# Patient Record
Sex: FEMALE | Race: BLACK OR AFRICAN AMERICAN | Employment: FULL TIME | URBAN - METROPOLITAN AREA
[De-identification: names, ages, dates, MRNs, and addresses within clinical notes are randomized per-mention and may not be internally consistent; named-entity substitution may affect disease eponyms.]

---

## 2018-02-15 ENCOUNTER — ANESTHESIA EVENT (OUTPATIENT)
Dept: SURGERY | Age: 38
End: 2018-02-15
Payer: OTHER MISCELLANEOUS

## 2018-02-16 ENCOUNTER — ANESTHESIA (OUTPATIENT)
Dept: SURGERY | Age: 38
End: 2018-02-16
Payer: OTHER MISCELLANEOUS

## 2018-02-16 ENCOUNTER — HOSPITAL ENCOUNTER (OUTPATIENT)
Age: 38
Setting detail: OUTPATIENT SURGERY
Discharge: HOME | End: 2018-02-16
Attending: ORTHOPAEDIC SURGERY | Admitting: ORTHOPAEDIC SURGERY
Payer: OTHER MISCELLANEOUS

## 2018-02-16 VITALS
HEART RATE: 83 BPM | SYSTOLIC BLOOD PRESSURE: 167 MMHG | BODY MASS INDEX: 50.22 KG/M2 | TEMPERATURE: 98.4 F | WEIGHT: 293 LBS | OXYGEN SATURATION: 97 % | DIASTOLIC BLOOD PRESSURE: 83 MMHG | RESPIRATION RATE: 15 BRPM

## 2018-02-16 PROCEDURE — 74011250636 HC RX REV CODE- 250/636

## 2018-02-16 PROCEDURE — 74011000250 HC RX REV CODE- 250

## 2018-02-16 PROCEDURE — 77030018836 HC SOL IRR NACL ICUM -A: Performed by: ORTHOPAEDIC SURGERY

## 2018-02-16 PROCEDURE — 74011250636 HC RX REV CODE- 250/636: Performed by: ANESTHESIOLOGY

## 2018-02-16 PROCEDURE — 77030020143 HC AIRWY LARYN INTUB CGAS -A: Performed by: ANESTHESIOLOGY

## 2018-02-16 PROCEDURE — 74011250636 HC RX REV CODE- 250/636: Performed by: ORTHOPAEDIC SURGERY

## 2018-02-16 PROCEDURE — 74011250637 HC RX REV CODE- 250/637: Performed by: ANESTHESIOLOGY

## 2018-02-16 PROCEDURE — 76010000138 HC OR TIME 0.5 TO 1 HR: Performed by: ORTHOPAEDIC SURGERY

## 2018-02-16 PROCEDURE — 77030006668 HC BLD SHV MENSCS STRY -B: Performed by: ORTHOPAEDIC SURGERY

## 2018-02-16 PROCEDURE — 76210000063 HC OR PH I REC FIRST 0.5 HR: Performed by: ORTHOPAEDIC SURGERY

## 2018-02-16 PROCEDURE — 76060000032 HC ANESTHESIA 0.5 TO 1 HR: Performed by: ORTHOPAEDIC SURGERY

## 2018-02-16 PROCEDURE — 76210000021 HC REC RM PH II 0.5 TO 1 HR: Performed by: ORTHOPAEDIC SURGERY

## 2018-02-16 PROCEDURE — 77030029203 HC IRR KT CYSTO/TUR BBMI -A: Performed by: ORTHOPAEDIC SURGERY

## 2018-02-16 RX ORDER — LIDOCAINE HYDROCHLORIDE 10 MG/ML
0.1 INJECTION INFILTRATION; PERINEURAL AS NEEDED
Status: DISCONTINUED | OUTPATIENT
Start: 2018-02-16 | End: 2018-02-16 | Stop reason: HOSPADM

## 2018-02-16 RX ORDER — FENTANYL CITRATE 50 UG/ML
INJECTION, SOLUTION INTRAMUSCULAR; INTRAVENOUS AS NEEDED
Status: DISCONTINUED | OUTPATIENT
Start: 2018-02-16 | End: 2018-02-16 | Stop reason: HOSPADM

## 2018-02-16 RX ORDER — FENTANYL CITRATE 50 UG/ML
100 INJECTION, SOLUTION INTRAMUSCULAR; INTRAVENOUS ONCE
Status: DISCONTINUED | OUTPATIENT
Start: 2018-02-16 | End: 2018-02-16 | Stop reason: HOSPADM

## 2018-02-16 RX ORDER — PROPOFOL 10 MG/ML
INJECTION, EMULSION INTRAVENOUS AS NEEDED
Status: DISCONTINUED | OUTPATIENT
Start: 2018-02-16 | End: 2018-02-16 | Stop reason: HOSPADM

## 2018-02-16 RX ORDER — ONDANSETRON 2 MG/ML
INJECTION INTRAMUSCULAR; INTRAVENOUS AS NEEDED
Status: DISCONTINUED | OUTPATIENT
Start: 2018-02-16 | End: 2018-02-16 | Stop reason: HOSPADM

## 2018-02-16 RX ORDER — OXYCODONE HYDROCHLORIDE 5 MG/1
5 TABLET ORAL
Status: DISCONTINUED | OUTPATIENT
Start: 2018-02-16 | End: 2018-02-16 | Stop reason: HOSPADM

## 2018-02-16 RX ORDER — HYDROCODONE BITARTRATE AND ACETAMINOPHEN 5; 325 MG/1; MG/1
2 TABLET ORAL AS NEEDED
Status: DISCONTINUED | OUTPATIENT
Start: 2018-02-16 | End: 2018-02-16 | Stop reason: HOSPADM

## 2018-02-16 RX ORDER — FAMOTIDINE 20 MG/1
20 TABLET, FILM COATED ORAL ONCE
Status: COMPLETED | OUTPATIENT
Start: 2018-02-16 | End: 2018-02-16

## 2018-02-16 RX ORDER — MIDAZOLAM HYDROCHLORIDE 1 MG/ML
2 INJECTION, SOLUTION INTRAMUSCULAR; INTRAVENOUS
Status: DISCONTINUED | OUTPATIENT
Start: 2018-02-16 | End: 2018-02-16 | Stop reason: HOSPADM

## 2018-02-16 RX ORDER — MIDAZOLAM HYDROCHLORIDE 1 MG/ML
5 INJECTION, SOLUTION INTRAMUSCULAR; INTRAVENOUS ONCE
Status: DISCONTINUED | OUTPATIENT
Start: 2018-02-16 | End: 2018-02-16 | Stop reason: HOSPADM

## 2018-02-16 RX ORDER — LIDOCAINE HYDROCHLORIDE 20 MG/ML
INJECTION, SOLUTION EPIDURAL; INFILTRATION; INTRACAUDAL; PERINEURAL AS NEEDED
Status: DISCONTINUED | OUTPATIENT
Start: 2018-02-16 | End: 2018-02-16 | Stop reason: HOSPADM

## 2018-02-16 RX ORDER — SODIUM CHLORIDE, SODIUM LACTATE, POTASSIUM CHLORIDE, CALCIUM CHLORIDE 600; 310; 30; 20 MG/100ML; MG/100ML; MG/100ML; MG/100ML
75 INJECTION, SOLUTION INTRAVENOUS CONTINUOUS
Status: DISCONTINUED | OUTPATIENT
Start: 2018-02-16 | End: 2018-02-16 | Stop reason: HOSPADM

## 2018-02-16 RX ORDER — HYDROMORPHONE HYDROCHLORIDE 2 MG/ML
0.5 INJECTION, SOLUTION INTRAMUSCULAR; INTRAVENOUS; SUBCUTANEOUS
Status: DISCONTINUED | OUTPATIENT
Start: 2018-02-16 | End: 2018-02-16 | Stop reason: HOSPADM

## 2018-02-16 RX ORDER — ROPIVACAINE HYDROCHLORIDE 5 MG/ML
INJECTION, SOLUTION EPIDURAL; INFILTRATION; PERINEURAL AS NEEDED
Status: DISCONTINUED | OUTPATIENT
Start: 2018-02-16 | End: 2018-02-16 | Stop reason: HOSPADM

## 2018-02-16 RX ADMIN — FENTANYL CITRATE 50 MCG: 50 INJECTION, SOLUTION INTRAMUSCULAR; INTRAVENOUS at 13:34

## 2018-02-16 RX ADMIN — SODIUM CHLORIDE, SODIUM LACTATE, POTASSIUM CHLORIDE, AND CALCIUM CHLORIDE 75 ML/HR: 600; 310; 30; 20 INJECTION, SOLUTION INTRAVENOUS at 12:00

## 2018-02-16 RX ADMIN — FENTANYL CITRATE 50 MCG: 50 INJECTION, SOLUTION INTRAMUSCULAR; INTRAVENOUS at 13:26

## 2018-02-16 RX ADMIN — SODIUM CHLORIDE, SODIUM LACTATE, POTASSIUM CHLORIDE, AND CALCIUM CHLORIDE: 600; 310; 30; 20 INJECTION, SOLUTION INTRAVENOUS at 13:18

## 2018-02-16 RX ADMIN — MIDAZOLAM HYDROCHLORIDE 2 MG: 1 INJECTION, SOLUTION INTRAMUSCULAR; INTRAVENOUS at 11:00

## 2018-02-16 RX ADMIN — FAMOTIDINE 20 MG: 20 TABLET, FILM COATED ORAL at 12:00

## 2018-02-16 RX ADMIN — PROPOFOL 150 MG: 10 INJECTION, EMULSION INTRAVENOUS at 13:27

## 2018-02-16 RX ADMIN — LIDOCAINE HYDROCHLORIDE 100 MG: 20 INJECTION, SOLUTION EPIDURAL; INFILTRATION; INTRACAUDAL; PERINEURAL at 13:26

## 2018-02-16 RX ADMIN — PROPOFOL 200 MG: 10 INJECTION, EMULSION INTRAVENOUS at 13:26

## 2018-02-16 RX ADMIN — ONDANSETRON 4 MG: 2 INJECTION INTRAMUSCULAR; INTRAVENOUS at 13:36

## 2018-02-16 NOTE — IP AVS SNAPSHOT
[address] 
[phone] Patient: Gale Ellis MRN: [id number] CXR:2/65/3485 About your hospitalization You were admitted on:  February 16, 2018 You last received care in the:  [place] [address] You were discharged on:  February 16, 2018 Why you were hospitalized Your primary diagnosis was:  Not on File Follow-up Information Follow up With Details Comments Contact Info None   None ([phone]) Patient stated that they have no PCP Maria lAejandra Laureano MD  Office will call for follow up appointment [address] [facility] [address] 
[phone] Discharge Orders None A check janell indicates which time of day the medication should be taken. My Medications Notice You have not been prescribed any medications. Discharge Instructions POST OPERATIVE INSTRUCTIONS FOR KNEE ARTHROSCOPY 1. Unless you receive other instructions, you may weight bear as tolerated 2. Apply ice to your knee for 20-30 minutes several times per day for the first 3 days and then as needed. Icing will decrease the amount of inflammation and is helpful after exercise 3. You may remove the dressings the following day and apply waterproof band aids. Some bleeding and drainage may persist for several days following  surgery. Waterproof band aids may be used while showering and avoid tub baths for two weeks. 4. You will be given pain medications and do not drive under the influence. Some patients take pain medication at night and antiinflammatory medication during day  when pain decreases. 5. You may be given Phenergan for nausea 6. You will receive a phone call from our office notifying you of your follow up visit 7. If any problems call [phone] TYPICAL SIDE EFFECTS OF PAIN MEDICATION: 
 *    Constipation: Drink lots of fluids, try prune juice. OTC stool softener if needed. *    Nausea: Take pain medication with food. ACTIVITY · As tolerated and as directed by your doctor. · Bathe or shower as directed by your doctor. DIET 
· Day of surgery: Clear liquids until no nausea or vomiting; small portion, light diet [place] foods (ex: baked chicken, plain rice, grits, scrambled eggs, toast). Nothing greasy, fried or spicy today. · Advance to regular diet on second day, unless your doctor orders otherwise. · If nausea and vomiting continues, call your doctor. PAIN 
· Take pain medication as directed by your doctor. · DO NOT take aspirin or blood thinners unless directed by your doctor. CALL YOUR DOCTOR IF   
s Call your doctor if pain is NOT relieved by medication.  
s Excessive bleeding that does not stop after holding pressure over the area · Temperature of 101 degrees F or above · Excessive redness, swelling or bruising, and/ or green or yellow, smelly discharge from incision AFTER ANESTHESIA · For the first 24 hours: DO NOT Drive, Drink alcoholic beverages, or Make important decisions. · Be aware of dizziness following anesthesia and while taking pain medication. DISCHARGE SUMMARY from Nurse PATIENT INSTRUCTIONS: 
 
After general anesthesia or intravenous sedation, for 24 hours or while taking prescription Narcotics: · Limit your activities · Do not drive and operate hazardous machinery · Do not make important personal or business decisions · Do  not drink alcoholic beverages · If you have not urinated within 8 hours after discharge, please contact your surgeon on call. *  Please give a list of your current medications to your Primary Care Provider. *  Please update this list whenever your medications are discontinued, doses are 
    changed, or new medications (including over-the-counter products) are added. *  Please carry medication information at all times in case of emergency situations. Preventing Infection at Home We care about preventing infection and avoiding the spread of germs  not only when you are in the hospital but also when you return home. When you return home from the hospital, its important to take the following steps to help prevent infection and avoid spreading germs that could infect you and others. Ask everyone in your home to follow these guidelines, too. Clean Your Hands · Clean your hands whenever your hands are visibly dirty, before you eat, before or after touching your mouth, nose or eyes, and before preparing food. Clean them after contact with body fluids, using the restroom, touching animals or changing diapers. · When washing hands, wet them with warm water and work up a lather. Rub hands for at least 15 seconds, then rinse them and pat them dry with a clean towel or paper towel. · When using hand sanitizers, it should take about 15 seconds to rub your hands dry. If not, you probably didnt apply enough . Cover Your Sneeze or Cough Germs are released into the air whenever you sneeze or cough. To prevent the spread of infection: · Turn away from other people before coughing or sneezing. · Cover your mouth or nose with a tissue when you cough or sneeze. Put the tissue in the trash. · If you dont have a tissue, cough or sneeze into your upper sleeve, not your hands. · Always clean your hands after coughing or sneezing. Care for Wounds Your skin is your bodys first line of defense against germs, but an open wound leaves an easy way for germs to enter your body. To prevent infection: · Clean your hands before and after changing wound dressings, and wear gloves to change dressings if recommended by your doctor. · Take special care with IV lines or other devices inserted into the body.  If you must touch them, clean your hands first. 
 · Follow any specific instructions from your doctor to care for your wounds. Contact your doctor if you experience any signs of infection, such as fever or increased redness at the surgical or wound site. Keep a [place] [address] · Clean or wipe commonly touched hard surfaces like door handles, sinks, tabletops, phones and TV remotes. · Use products labeled disinfectant to kill harmful bacteria and viruses. · Use a clean cloth or paper towel to clean and dry surfaces. Wiping surfaces with a dirty dishcloth, sponge or towel will only spread germs. · Never share toothbrushes, rosales, drinking glasses, utensils, razor blades, face cloths or bath towels to avoid spreading germs. · Be sure that the linens that you sleep on are clean. · Keep pets away from wounds and wash your hands after touching pets, their toys or bedding. We care about you and your health. Remember, preventing infections is a team effort between you, your family, friends and health care providers. These are general instructions for a healthy lifestyle: No smoking/ No tobacco products/ Avoid exposure to second hand smoke Surgeon General's Warning:  Quitting smoking now greatly reduces serious risk to your health. Obesity, smoking, and sedentary lifestyle greatly increases your risk for illness A healthy diet, regular physical exercise & weight monitoring are important for maintaining a healthy lifestyle You may be retaining fluid if you have a history of heart failure or if you experience any of the following symptoms:  Weight gain of 3 pounds or more overnight or 5 pounds in a week, increased swelling in our hands or feet or shortness of breath while lying flat in bed. Please call your doctor as soon as you notice any of these symptoms; do not wait until your next office visit. Recognize signs and symptoms of STROKE: 
 
F-face looks uneven A-arms unable to move or move unevenly S-speech slurred or non-existent T-time-call 911 as soon as signs and symptoms begin-DO NOT go Back to bed or wait to see if you get better-TIME IS BRAIN. Introducing hospitals & HEALTH SERVICES! Benedict Tejeda introduces [employer]fresh patient portal. Now you can access parts of your medical record, email your doctor's office, and request medication refills online. 1. In your internet browser, go to [email or web address] 2. Click on the First Time User? Click Here link in the Sign In box. You will see the New Member Sign Up page. 3. Enter your Purfresh Access Code exactly as it appears below. You will not need to use this code after youve completed the sign-up process. If you do not sign up before the expiration date, you must request a new code. · [employer]fresh Access Code: [email or web address] Expires: 5/14/2018  4:57 PM 
 
4. Enter the last four digits of your Social Security Number (xxxx) and Date of Birth (mm/dd/yyyy) as indicated and click Submit. You will be taken to the next sign-up page. 5. Create a Purfresh ID. This will be your Purfresh login ID and cannot be changed, so think of one that is secure and easy to remember. 6. Create a Purfresh password. You can change your password at any time. 7. Enter your Password Reset Question and Answer. This can be used at a later time if you forget your password. 8. Enter your e-mail address. You will receive e-mail notification when new information is available in [address]. 9. Click Sign Up. You can now view and download portions of your medical record. 10. Click the Download Summary menu link to download a portable copy of your medical information. If you have questions, please visit the Frequently Asked Questions section of the Purfresh website. Remember, [employer]fresh is NOT to be used for urgent needs. For medical emergencies, dial 911. Now available from your iPhone and Android! Providers Seen During Your Hospitalization Provider Specialty Primary office phone Shena Horn MD Orthopedic Surgery [phone] Your Primary Care Physician (PCP) Primary Care Physician Office Phone Office Fax NONE ** None ** ** None ** You are allergic to the following No active allergies Recent Documentation Weight BMI OB Status Smoking Status 158.8 kg 50.22 kg/m2 Hysterectomy Never Smoker Emergency Contacts Name Discharge Info Relation Home Work Mobile Nelson Cooper [[id number]] [phone] Patient Belongings The following personal items are in your possession at time of discharge: 
  Dental Appliances: None Please provide this summary of care documentation to your next provider. Signatures-by signing, you are acknowledging that this After Visit Summary has been reviewed with you and you have received a copy. Patient Signature:  ____________________________________________________________ Date:  ____________________________________________________________  
  
Chelle Serna Provider Signature:  ____________________________________________________________ Date:  ____________________________________________________________

## 2018-02-16 NOTE — H&P
Outpatient Surgery History and Physical:  Sharon Crawford was seen and examined. CHIEF COMPLAINT:    r knee . PE:   There were no vitals taken for this visit. Heart:   Regular rhythm      Lungs:  Are clear      Past Medical History: There are no active problems to display for this patient. Surgical History:   Past Surgical History:   Procedure Laterality Date    HX CYST REMOVAL      Left hand    HX TOTAL LAPAROSCOPIC HYSTERECTOMY  2011       Social History: Patient  reports that she has never smoked. She has never used smokeless tobacco. She reports that she drinks alcohol. She reports that she does not use illicit drugs. Family History:   Family History   Problem Relation Age of Onset    Diabetes Mother     Hypertension Mother    Leanor Teena Diabetes Father     Hypertension Father     Diabetes Sister     Hypertension Sister        Allergies: Reviewed per EMR  No Known Allergies    Medications:    No current facility-administered medications on file prior to encounter. No current outpatient prescriptions on file prior to encounter. The surgery is planned for the  Knee . History and physical has been reviewed. The patient has been examined. There have been no significant clinical changes since the completion of the originally dated History and Physical.  Patient identified by surgeon; surgical site was confirmed by patient and surgeon. The patient is here today for outpatient surgery. I have examined the patient, no changes are noted in the patient's medical status. Necessity for the procedure/care is still present and the history and physical above is current. See the office notes for the full long term history of the problem. Please see the recent office notes for the musculoskeletal examination.     Signed By: Terrell Tatum MD     February 16, 2018 7:00 AM

## 2018-02-16 NOTE — PERIOP NOTES
Discharge instructions given to boyfriend. Verbalized understanding and opportunity for questions was given. Dr Ellen Kang okay to discharge at this time. Pt and belongings taken via wheelchair to car.

## 2018-02-16 NOTE — ANESTHESIA POSTPROCEDURE EVALUATION
Post-Anesthesia Evaluation and Assessment    Patient: Mora Klinefelter MRN: [id number]  SSN: [id number]    YOB: 1980  Age: 40 y.o. Sex: female       Cardiovascular Function/Vital Signs  Visit Vitals    BP (!) 158/93 (BP 1 Location: Left arm)    Pulse 91    Temp 36.9 °C (98.4 °F)    Resp 24    Wt 158.8 kg (350 lb)    SpO2 100%    BMI 50.22 kg/m2       Patient is status post general anesthesia for Procedure(s):  KNEE ARTHROSCOPY RIGHT/ MEDIAL MENISCECTOMY/ABRASION ARTHROSCOPY. Nausea/Vomiting: None    Postoperative hydration reviewed and adequate. Pain:  Pain Scale 1: Numeric (0 - 10) (02/16/18 1355)  Pain Intensity 1: 0 (02/16/18 1355)   Managed    Neurological Status:   Neuro (WDL): Exceptions to WDL (02/16/18 1355)  Neuro  RLE Motor Response: Numbness (02/16/18 1355)   At baseline    Mental Status and Level of Consciousness: Arousable    Pulmonary Status:   O2 Device: Oxygen mask (02/16/18 1355)   Adequate oxygenation and airway patent    Complications related to anesthesia: None    Post-anesthesia assessment completed.  No concerns    Signed By: Cherry Correia MD     February 16, 2018

## 2018-02-16 NOTE — DISCHARGE INSTRUCTIONS
POST OPERATIVE INSTRUCTIONS FOR KNEE ARTHROSCOPY    1. Unless you receive other instructions, you may weight bear as tolerated      2. Apply ice to your knee for 20-30 minutes several times per day for the first 3 days and then as needed. Icing will decrease the amount of inflammation and is helpful after exercise    3. You may remove the dressings the following day and apply waterproof band aids. Some bleeding and drainage may persist for several days following  surgery. Waterproof band aids may be used while showering and avoid tub baths for two weeks. 4. You will be given pain medications and do not drive under the influence. Some patients take pain medication at night and antiinflammatory medication during day  when pain decreases. 5. You may be given Phenergan for nausea    6. You will receive a phone call from our office notifying you of your follow up visit    7. If any problems call [phone]    TYPICAL SIDE EFFECTS OF PAIN MEDICATION:  *    Constipation: Drink lots of fluids, try prune juice. OTC stool softener if needed. *    Nausea: Take pain medication with food. ACTIVITY  · As tolerated and as directed by your doctor. · Bathe or shower as directed by your doctor. DIET  · Day of surgery: Clear liquids until no nausea or vomiting; small portion, light diet [place] foods (ex: baked chicken, plain rice, grits, scrambled eggs, toast). Nothing greasy, fried or spicy today. · Advance to regular diet on second day, unless your doctor orders otherwise. · If nausea and vomiting continues, call your doctor. PAIN  · Take pain medication as directed by your doctor. · DO NOT take aspirin or blood thinners unless directed by your doctor.        CALL YOUR DOCTOR IF    s Call your doctor if pain is NOT relieved by medication.   s Excessive bleeding that does not stop after holding pressure over the area  · Temperature of 101 degrees F or above  · Excessive redness, swelling or bruising, and/ or green or yellow, smelly discharge from incision    AFTER ANESTHESIA   · For the first 24 hours: DO NOT Drive, Drink alcoholic beverages, or Make important decisions. · Be aware of dizziness following anesthesia and while taking pain medication. DISCHARGE SUMMARY from Nurse    PATIENT INSTRUCTIONS:    After general anesthesia or intravenous sedation, for 24 hours or while taking prescription Narcotics:  · Limit your activities  · Do not drive and operate hazardous machinery  · Do not make important personal or business decisions  · Do  not drink alcoholic beverages  · If you have not urinated within 8 hours after discharge, please contact your surgeon on call. *  Please give a list of your current medications to your Primary Care Provider. *  Please update this list whenever your medications are discontinued, doses are      changed, or new medications (including over-the-counter products) are added. *  Please carry medication information at all times in case of emergency situations. Preventing Infection at Home  We care about preventing infection and avoiding the spread of germs - not only when you are in the hospital but also when you return home. When you return home from the hospital, its important to take the following steps to help prevent infection and avoid spreading germs that could infect you and others. Ask everyone in your home to follow these guidelines, too. Clean Your Hands  · Clean your hands whenever your hands are visibly dirty, before you eat, before or after touching your mouth, nose or eyes, and before preparing food. Clean them after contact with body fluids, using the restroom, touching animals or changing diapers. · When washing hands, wet them with warm water and work up a lather. Rub hands for at least 15 seconds, then rinse them and pat them dry with a clean towel or paper towel.   · When using hand sanitizers, it should take about 15 seconds to rub your hands dry. If not, you probably didnt apply enough . Cover Your Sneeze or Cough  Germs are released into the air whenever you sneeze or cough. To prevent the spread of infection:  · Turn away from other people before coughing or sneezing. · Cover your mouth or nose with a tissue when you cough or sneeze. Put the tissue in the trash. · If you dont have a tissue, cough or sneeze into your upper sleeve, not your hands. · Always clean your hands after coughing or sneezing. Care for Wounds  Your skin is your bodys first line of defense against germs, but an open wound leaves an easy way for germs to enter your body. To prevent infection:  · Clean your hands before and after changing wound dressings, and wear gloves to change dressings if recommended by your doctor. · Take special care with IV lines or other devices inserted into the body. If you must touch them, clean your hands first.  · Follow any specific instructions from your doctor to care for your wounds. Contact your doctor if you experience any signs of infection, such as fever or increased redness at the surgical or wound site. Keep a Clean Home  · Clean or wipe commonly touched hard surfaces like door handles, sinks, tabletops, phones and TV remotes. · Use products labeled disinfectant to kill harmful bacteria and viruses. · Use a clean cloth or paper towel to clean and dry surfaces. Wiping surfaces with a dirty dishcloth, sponge or towel will only spread germs. · Never share toothbrushes, rosales, drinking glasses, utensils, razor blades, face cloths or bath towels to avoid spreading germs. · Be sure that the linens that you sleep on are clean. · Keep pets away from wounds and wash your hands after touching pets, their toys or bedding. We care about you and your health. Remember, preventing infections is a team effort between you, your family, friends and health care providers.        These are general instructions for a healthy lifestyle:    No smoking/ No tobacco products/ Avoid exposure to second hand smoke    Surgeon General's Warning:  Quitting smoking now greatly reduces serious risk to your health. Obesity, smoking, and sedentary lifestyle greatly increases your risk for illness    A healthy diet, regular physical exercise & weight monitoring are important for maintaining a healthy lifestyle    You may be retaining fluid if you have a history of heart failure or if you experience any of the following symptoms:  Weight gain of 3 pounds or more overnight or 5 pounds in a week, increased swelling in our hands or feet or shortness of breath while lying flat in bed. Please call your doctor as soon as you notice any of these symptoms; do not wait until your next office visit. Recognize signs and symptoms of STROKE:    F-face looks uneven    A-arms unable to move or move unevenly    S-speech slurred or non-existent    T-time-call 911 as soon as signs and symptoms begin-DO NOT go       Back to bed or wait to see if you get better-TIME IS BRAIN.

## 2018-02-16 NOTE — OP NOTES
[facility] MADDIE Angeles  MR#: [id number]  : 1980  ACCOUNT #: [de-identified]   DATE OF SERVICE: 2018    PREOPERATIVE DIAGNOSIS:  Possible meniscal tear of the right knee. POSTOPERATIVE DIAGNOSES:    1. Flap tear of the posterior third of the right medial meniscus. 2.  Small flap tear of the middle third of the lateral meniscus. 3.  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 4.  Thickened hypertrophic medial synovial plica. OPERATIONS PERFORMED:  1. Arthroscopic medial and lateral meniscectomy. 2.  Excision of thickened hypertrophic medial synovial plica. 3.  Chondroplasty of the patella. SURGEON:  Barry Morton MD    ASSISTANT:  None. ANESTHESIA:  General.    BONE LOSS:  Minimal.     ESTIMATED BLOOD LOSS:  None. SPECIMENS REMOVED:  None. COMPLICATIONS:  None. IMPLANTS:  None. DESCRIPTION OF PROCEDURE:  After an adequate level of general anesthesia was obtained, the right leg was prepped and draped in usual sterile fashion. A tourniquet was not used for the procedure. Anteromedial and anterolateral portals were made. The patella tracked well. There was grade II chondromalacia of the undersurface of the patella. A chondroplasty was performed. There was no eburnated bone. There was a thickened hypertrophic medial synovial plica which was resected. This was quite thickened and fibrotic. The ACL and the PCL intact. The medial compartment articular surfaces looked fine except for some mild chondromalacia. A small flap tear of the posterior horn of the medial meniscus resected with the basket and the shaver and left with a stable rim. In the lateral compartment the lateral meniscus was stressed and probed. There was a small flap tear at the middle third, resected with a basket and a shaver. There was no significant chondromalacia laterally. The gutters were clear.   The knee was then copiously irrigated. Sterile dressing was applied.       MD Maikel Berg / Rodney Han  D: 02/16/2018 13:44     T: 02/16/2018 14:45  JOB #: [id number]  CC: Jeffy Nolen MD

## 2018-02-16 NOTE — ANESTHESIA PREPROCEDURE EVALUATION
Anesthetic History   No history of anesthetic complications            Review of Systems / Medical History  Patient summary reviewed and pertinent labs reviewed    Pulmonary  Within defined limits                 Neuro/Psych   Within defined limits           Cardiovascular  Within defined limits                Exercise tolerance: >4 METS     GI/Hepatic/Renal  Within defined limits              Endo/Other        Morbid obesity     Other Findings              Physical Exam    Airway  Mallampati: II  TM Distance: 4 - 6 cm  Neck ROM: normal range of motion   Mouth opening: Normal     Cardiovascular  Regular rate and rhythm,  S1 and S2 normal,  no murmur, click, rub, or gallop             Dental         Pulmonary  Breath sounds clear to auscultation               Abdominal  GI exam deferred       Other Findings            Anesthetic Plan    ASA: 3  Anesthesia type: general          Induction: Intravenous  Anesthetic plan and risks discussed with: Patient

## 2020-10-30 ENCOUNTER — HOSPITAL ENCOUNTER (EMERGENCY)
Age: 40
Discharge: HOME | End: 2020-10-30
Attending: EMERGENCY MEDICINE
Payer: MEDICAID

## 2020-10-30 ENCOUNTER — APPOINTMENT (OUTPATIENT)
Dept: GENERAL RADIOLOGY | Age: 40
End: 2020-10-30
Payer: MEDICAID

## 2020-10-30 VITALS
TEMPERATURE: 97.3 F | WEIGHT: 293 LBS | BODY MASS INDEX: 43.4 KG/M2 | RESPIRATION RATE: 18 BRPM | DIASTOLIC BLOOD PRESSURE: 100 MMHG | HEIGHT: 69 IN | OXYGEN SATURATION: 97 % | SYSTOLIC BLOOD PRESSURE: 143 MMHG | HEART RATE: 100 BPM

## 2020-10-30 PROCEDURE — 72100 X-RAY EXAM L-S SPINE 2/3 VWS: CPT

## 2020-10-30 PROCEDURE — 6360000002 HC RX W HCPCS: Performed by: EMERGENCY MEDICINE

## 2020-10-30 PROCEDURE — 96372 THER/PROPH/DIAG INJ SC/IM: CPT

## 2020-10-30 PROCEDURE — G0382 LEV 3 HOSP TYPE B ED VISIT: HCPCS

## 2020-10-30 RX ORDER — DEXAMETHASONE SODIUM PHOSPHATE 10 MG/ML
10 INJECTION, SOLUTION INTRAMUSCULAR; INTRAVENOUS ONCE
Status: COMPLETED | OUTPATIENT
Start: 2020-10-30 | End: 2020-10-30

## 2020-10-30 RX ORDER — KETOROLAC TROMETHAMINE 30 MG/ML
30 INJECTION, SOLUTION INTRAMUSCULAR; INTRAVENOUS ONCE
Status: COMPLETED | OUTPATIENT
Start: 2020-10-30 | End: 2020-10-30

## 2020-10-30 RX ORDER — METHYLPREDNISOLONE 4 MG/1
TABLET ORAL
Qty: 1 KIT | Refills: 0 | Status: SHIPPED | OUTPATIENT
Start: 2020-10-30 | End: 2020-11-05

## 2020-10-30 RX ORDER — MELOXICAM 15 MG/1
15 TABLET ORAL DAILY
Qty: 30 TABLET | Refills: 0 | Status: SHIPPED | OUTPATIENT
Start: 2020-10-30 | End: 2020-12-30 | Stop reason: ALTCHOICE

## 2020-10-30 RX ADMIN — KETOROLAC TROMETHAMINE 30 MG: 30 INJECTION, SOLUTION INTRAMUSCULAR; INTRAVENOUS at 13:36

## 2020-10-30 RX ADMIN — DEXAMETHASONE SODIUM PHOSPHATE 10 MG: 10 INJECTION INTRAMUSCULAR; INTRAVENOUS at 13:36

## 2020-10-30 ASSESSMENT — PAIN DESCRIPTION - ONSET: ONSET: ON-GOING

## 2020-10-30 ASSESSMENT — ENCOUNTER SYMPTOMS
WHEEZING: 0
EYE DISCHARGE: 0
DIARRHEA: 0
ABDOMINAL DISTENTION: 0
SORE THROAT: 0
BACK PAIN: 1
COUGH: 0
SHORTNESS OF BREATH: 0
EYE REDNESS: 0
EYE PAIN: 0
NAUSEA: 0
SINUS PRESSURE: 0
VOMITING: 0

## 2020-10-30 ASSESSMENT — PAIN DESCRIPTION - FREQUENCY: FREQUENCY: CONTINUOUS

## 2020-10-30 ASSESSMENT — PAIN SCALES - GENERAL
PAINLEVEL_OUTOF10: 8
PAINLEVEL_OUTOF10: 8

## 2020-10-30 ASSESSMENT — PAIN DESCRIPTION - PROGRESSION: CLINICAL_PROGRESSION: NOT CHANGED

## 2020-10-30 ASSESSMENT — PAIN DESCRIPTION - ORIENTATION: ORIENTATION: LOWER

## 2020-10-30 ASSESSMENT — PAIN DESCRIPTION - PAIN TYPE: TYPE: ACUTE PAIN

## 2020-10-30 ASSESSMENT — PAIN DESCRIPTION - DESCRIPTORS: DESCRIPTORS: SHOOTING;SHARP

## 2020-10-30 NOTE — ED PROVIDER NOTES
The history is provided by the patient. Back Pain   Location:  Sacro-iliac joint and lumbar spine  Quality:  Aching  Pain severity:  Moderate  Onset quality:  Gradual  Duration:  1 week  Chronicity:  New  Associated symptoms: no chest pain, no dysuria, no fever, no headaches, no leg pain and no weakness         Review of Systems   Constitutional: Negative for chills and fever. HENT: Negative for ear pain, sinus pressure and sore throat. Eyes: Negative for pain, discharge and redness. Respiratory: Negative for cough, shortness of breath and wheezing. Cardiovascular: Negative for chest pain. Gastrointestinal: Negative for abdominal distention, diarrhea, nausea and vomiting. Genitourinary: Negative for dysuria and frequency. Musculoskeletal: Positive for back pain. Negative for arthralgias. Skin: Negative for rash and wound. Neurological: Negative for weakness and headaches. Hematological: Negative for adenopathy. All other systems reviewed and are negative. Physical Exam  Vitals signs and nursing note reviewed. Constitutional:       Appearance: She is well-developed. HENT:      Head: Normocephalic and atraumatic. Right Ear: Hearing and external ear normal.      Left Ear: Hearing and external ear normal.      Nose: Nose normal.      Mouth/Throat:      Pharynx: Uvula midline. Eyes:      General: Lids are normal.      Conjunctiva/sclera: Conjunctivae normal.      Pupils: Pupils are equal, round, and reactive to light. Neck:      Musculoskeletal: Normal range of motion and neck supple. Cardiovascular:      Rate and Rhythm: Normal rate and regular rhythm. Heart sounds: Normal heart sounds. No murmur. Pulmonary:      Effort: Pulmonary effort is normal.      Breath sounds: Normal breath sounds. Abdominal:      General: Bowel sounds are normal.      Palpations: Abdomen is soft. Abdomen is not rigid. Tenderness: There is no abdominal tenderness.  There is no guarding or rebound. Musculoskeletal:      Lumbar back: She exhibits decreased range of motion and tenderness. Back:    Skin:     General: Skin is warm and dry. Findings: No abrasion or rash. Neurological:      Mental Status: She is alert and oriented to person, place, and time. GCS: GCS eye subscore is 4. GCS verbal subscore is 5. GCS motor subscore is 6. Cranial Nerves: No cranial nerve deficit. Sensory: No sensory deficit. Coordination: Coordination normal.      Gait: Gait normal.          Procedures     MDM          --------------------------------------------- PAST HISTORY ---------------------------------------------  Past Medical History:  has a past medical history of Bone spur, DDD (degenerative disc disease), lumbar, Diabetes mellitus ([facility] [address] Hypertension, and Thyroid disease. Past Surgical History:  has a past surgical history that includes Tonsillectomy; Hysterectomy; other surgical history (Left, 7/31/2015); and knee surgery. Social History:  reports that she has never smoked. She has never used smokeless tobacco. She reports that she does not drink alcohol or use drugs. Family History: family history includes Arthritis in an other family member; Depression in an other family member; Diabetes in her father and another family member; Heart Disease in her mother and another family member; Hypertension in an other family member; Stroke in an other family member. The patients home medications have been reviewed. Allergies: Other and Iodine    -------------------------------------------------- RESULTS -------------------------------------------------  Labs:  No results found for this visit on 10/30/20. Radiology:  XR LUMBAR SPINE (2-3 VIEWS)   Final Result   No acute osseous abnormality. Question gallstones.          Xr Lumbar Spine (2-3 Views)    Result Date: 10/30/2020  EXAMINATION: THREE XRAY VIEWS OF THE LUMBAR SPINE 10/30/2020 1:42 pm COMPARISON: None. HISTORY: ORDERING SYSTEM PROVIDED HISTORY: pain TECHNOLOGIST PROVIDED HISTORY: Reason for exam:->pain No additional history provided. FINDINGS: The lumbar spine is in normal anatomic alignment. Vertebral body heights and intervertebral disc space heights are well maintained. Mild facet arthropathy is present at L5-S1 bilaterally. There is question of multiple gallstones in the right upper quadrant, only visualized on the AP view. This could be correlated with right upper quadrant ultrasound, if clinically warranted. No acute osseous abnormality. Question gallstones. ------------------------- NURSING NOTES AND VITALS REVIEWED ---------------------------  Date / Time Roomed:  10/30/2020  1:15 PM  ED Bed Assignment:  04/04    The nursing notes within the ED encounter and vital signs as below have been reviewed. BP (!) 143/100   Pulse 100   Temp 97.3 °F (36.3 °C) (Temporal)   Resp 18   Ht 5' 9\" (1.753 m)   Wt (!) 400 lb (181.4 kg)   SpO2 97%   BMI 59.07 kg/m²   Oxygen Saturation Interpretation: Normal      ------------------------------------------ PROGRESS NOTES ------------------------------------------  I have spoken with the patient and discussed todays results, in addition to providing specific details for the plan of care and counseling regarding the diagnosis and prognosis. Their questions are answered at this time and they are agreeable with the plan. I discussed at length with them reasons for immediate return here for re evaluation. They will followup with primary care by calling their office tomorrow.     Medications   ketorolac (TORADOL) injection 30 mg (30 mg Intramuscular Given 10/30/20 1336)   dexamethasone (PF) (DECADRON) injection 10 mg (10 mg Intramuscular Given 10/30/20 1336)         --------------------------------- ADDITIONAL PROVIDER NOTES ---------------------------------  At this time the patient is without objective evidence of an acute process requiring hospitalization or inpatient management. They have remained hemodynamically stable throughout their entire ED visit and are stable for discharge with outpatient follow-up. The plan has been discussed in detail and they are aware of the specific conditions for emergent return, as well as the importance of follow-up. New Prescriptions    MELOXICAM (MOBIC) 15 MG TABLET    Take 1 tablet by mouth daily    METHYLPREDNISOLONE (MEDROL, EDMUNDO,) 4 MG TABLET    Take by mouth. Diagnosis:  1. Sacro-iliac pain        Disposition:  Patient's disposition: Discharge to home  Patient's condition is stable.                       Korin Moura MD  10/30/20 [id number]

## 2020-12-30 ENCOUNTER — HOSPITAL ENCOUNTER (INPATIENT)
Age: 40
LOS: 4 days | Discharge: HOME | DRG: 720 | End: 2021-01-03
Attending: EMERGENCY MEDICINE | Admitting: INTERNAL MEDICINE
Payer: MEDICAID

## 2020-12-30 ENCOUNTER — APPOINTMENT (OUTPATIENT)
Dept: GENERAL RADIOLOGY | Age: 40
DRG: 720 | End: 2020-12-30
Payer: MEDICAID

## 2020-12-30 DIAGNOSIS — J96.01 ACUTE RESPIRATORY FAILURE WITH HYPOXIA (HCC): Primary | ICD-10-CM

## 2020-12-30 DIAGNOSIS — U07.1 COVID-19 VIRUS DETECTED: ICD-10-CM

## 2020-12-30 PROBLEM — E66.01 CLASS 3 SEVERE OBESITY DUE TO EXCESS CALORIES WITH BODY MASS INDEX (BMI) OF 50.0 TO 59.9 IN ADULT (HCC): Chronic | Status: ACTIVE | Noted: 2020-12-30

## 2020-12-30 PROBLEM — E66.813 CLASS 3 SEVERE OBESITY DUE TO EXCESS CALORIES WITH BODY MASS INDEX (BMI) OF 50.0 TO 59.9 IN ADULT: Chronic | Status: ACTIVE | Noted: 2020-12-30

## 2020-12-30 LAB
ALBUMIN SERPL-MCNC: 3.7 G/DL (ref 3.5–5.2)
ALP BLD-CCNC: 80 U/L (ref 35–104)
ALT SERPL-CCNC: 15 U/L (ref 0–32)
ANION GAP SERPL CALCULATED.3IONS-SCNC: 10 MMOL/L (ref 7–16)
APTT: 26.7 SEC (ref 24.5–35.1)
AST SERPL-CCNC: 22 U/L (ref 0–31)
BASOPHILS ABSOLUTE: 0.02 E9/L (ref 0–0.2)
BASOPHILS RELATIVE PERCENT: 0.2 % (ref 0–2)
BILIRUB SERPL-MCNC: 0.3 MG/DL (ref 0–1.2)
BILIRUBIN DIRECT: <0.2 MG/DL (ref 0–0.3)
BILIRUBIN, INDIRECT: NORMAL MG/DL (ref 0–1)
BUN BLDV-MCNC: 5 MG/DL (ref 6–20)
CALCIUM SERPL-MCNC: 8.4 MG/DL (ref 8.6–10.2)
CHLORIDE BLD-SCNC: 101 MMOL/L (ref 98–107)
CO2: 26 MMOL/L (ref 22–29)
CREAT SERPL-MCNC: 0.6 MG/DL (ref 0.5–1)
EKG ATRIAL RATE: 97 BPM
EKG P AXIS: 43 DEGREES
EKG P-R INTERVAL: 166 MS
EKG Q-T INTERVAL: 390 MS
EKG QRS DURATION: 84 MS
EKG QTC CALCULATION (BAZETT): 495 MS
EKG R AXIS: 42 DEGREES
EKG T AXIS: -15 DEGREES
EKG VENTRICULAR RATE: 97 BPM
EOSINOPHILS ABSOLUTE: 0 E9/L (ref 0.05–0.5)
EOSINOPHILS RELATIVE PERCENT: 0 % (ref 0–6)
GFR AFRICAN AMERICAN: >60
GFR NON-AFRICAN AMERICAN: >60 ML/MIN/1.73
GLUCOSE BLD-MCNC: 291 MG/DL (ref 74–99)
HCT VFR BLD CALC: 38.1 % (ref 34–48)
HEMOGLOBIN: 12.1 G/DL (ref 11.5–15.5)
IMMATURE GRANULOCYTES #: 0.04 E9/L
IMMATURE GRANULOCYTES %: 0.4 % (ref 0–5)
INR BLD: 1.1
LYMPHOCYTES ABSOLUTE: 1.23 E9/L (ref 1.5–4)
LYMPHOCYTES RELATIVE PERCENT: 13.7 % (ref 20–42)
MCH RBC QN AUTO: 28.8 PG (ref 26–35)
MCHC RBC AUTO-ENTMCNC: 31.8 % (ref 32–34.5)
MCV RBC AUTO: 90.7 FL (ref 80–99.9)
MONOCYTES ABSOLUTE: 0.18 E9/L (ref 0.1–0.95)
MONOCYTES RELATIVE PERCENT: 2 % (ref 2–12)
NEUTROPHILS ABSOLUTE: 7.5 E9/L (ref 1.8–7.3)
NEUTROPHILS RELATIVE PERCENT: 83.7 % (ref 43–80)
PDW BLD-RTO: 13.8 FL (ref 11.5–15)
PLATELET # BLD: 208 E9/L (ref 130–450)
PMV BLD AUTO: 10.8 FL (ref 7–12)
POTASSIUM REFLEX MAGNESIUM: 3.8 MMOL/L (ref 3.5–5)
PRO-BNP: 9 PG/ML (ref 0–125)
PROTHROMBIN TIME: 12.2 SEC (ref 9.3–12.4)
RBC # BLD: 4.2 E12/L (ref 3.5–5.5)
SARS-COV-2, NAAT: DETECTED
SODIUM BLD-SCNC: 137 MMOL/L (ref 132–146)
TOTAL PROTEIN: 7.6 G/DL (ref 6.4–8.3)
TROPONIN: <0.01 NG/ML (ref 0–0.03)
WBC # BLD: 9 E9/L (ref 4.5–11.5)

## 2020-12-30 PROCEDURE — 6360000002 HC RX W HCPCS: Performed by: EMERGENCY MEDICINE

## 2020-12-30 PROCEDURE — 85610 PROTHROMBIN TIME: CPT

## 2020-12-30 PROCEDURE — 84484 ASSAY OF TROPONIN QUANT: CPT

## 2020-12-30 PROCEDURE — 80076 HEPATIC FUNCTION PANEL: CPT

## 2020-12-30 PROCEDURE — 6370000000 HC RX 637 (ALT 250 FOR IP): Performed by: EMERGENCY MEDICINE

## 2020-12-30 PROCEDURE — 6370000000 HC RX 637 (ALT 250 FOR IP): Performed by: INTERNAL MEDICINE

## 2020-12-30 PROCEDURE — 80048 BASIC METABOLIC PNL TOTAL CA: CPT

## 2020-12-30 PROCEDURE — 85730 THROMBOPLASTIN TIME PARTIAL: CPT

## 2020-12-30 PROCEDURE — 83880 ASSAY OF NATRIURETIC PEPTIDE: CPT

## 2020-12-30 PROCEDURE — 99223 1ST HOSP IP/OBS HIGH 75: CPT | Performed by: INTERNAL MEDICINE

## 2020-12-30 PROCEDURE — 2500000003 HC RX 250 WO HCPCS: Performed by: INTERNAL MEDICINE

## 2020-12-30 PROCEDURE — XW033E5 INTRODUCTION OF REMDESIVIR ANTI-INFECTIVE INTO PERIPHERAL VEIN, PERCUTANEOUS APPROACH, NEW TECHNOLOGY GROUP 5: ICD-10-PCS | Performed by: INTERNAL MEDICINE

## 2020-12-30 PROCEDURE — 2580000003 HC RX 258: Performed by: EMERGENCY MEDICINE

## 2020-12-30 PROCEDURE — 2700000000 HC OXYGEN THERAPY PER DAY

## 2020-12-30 PROCEDURE — 6360000002 HC RX W HCPCS: Performed by: INTERNAL MEDICINE

## 2020-12-30 PROCEDURE — 2060000000 HC ICU INTERMEDIATE R&B

## 2020-12-30 PROCEDURE — 87449 NOS EACH ORGANISM AG IA: CPT

## 2020-12-30 PROCEDURE — 85025 COMPLETE CBC W/AUTO DIFF WBC: CPT

## 2020-12-30 PROCEDURE — 71045 X-RAY EXAM CHEST 1 VIEW: CPT

## 2020-12-30 PROCEDURE — 99285 EMERGENCY DEPT VISIT HI MDM: CPT

## 2020-12-30 PROCEDURE — 93005 ELECTROCARDIOGRAM TRACING: CPT | Performed by: EMERGENCY MEDICINE

## 2020-12-30 PROCEDURE — U0002 COVID-19 LAB TEST NON-CDC: HCPCS

## 2020-12-30 PROCEDURE — 2580000003 HC RX 258: Performed by: INTERNAL MEDICINE

## 2020-12-30 RX ORDER — ACETAMINOPHEN 650 MG/1
650 SUPPOSITORY RECTAL EVERY 6 HOURS PRN
Status: DISCONTINUED | OUTPATIENT
Start: 2020-12-30 | End: 2020-12-30 | Stop reason: SDUPTHER

## 2020-12-30 RX ORDER — ATORVASTATIN CALCIUM 10 MG/1
10 TABLET, FILM COATED ORAL DAILY
Status: DISCONTINUED | OUTPATIENT
Start: 2020-12-30 | End: 2021-01-03 | Stop reason: HOSPADM

## 2020-12-30 RX ORDER — GAUZE BANDAGE 2" X 2"
100 BANDAGE TOPICAL DAILY
Status: DISCONTINUED | OUTPATIENT
Start: 2020-12-30 | End: 2021-01-03 | Stop reason: HOSPADM

## 2020-12-30 RX ORDER — VITAMIN B COMPLEX
6000 TABLET ORAL DAILY
Status: DISCONTINUED | OUTPATIENT
Start: 2020-12-30 | End: 2020-12-30 | Stop reason: CLARIF

## 2020-12-30 RX ORDER — 0.9 % SODIUM CHLORIDE 0.9 %
1000 INTRAVENOUS SOLUTION INTRAVENOUS ONCE
Status: COMPLETED | OUTPATIENT
Start: 2020-12-30 | End: 2020-12-30

## 2020-12-30 RX ORDER — ONDANSETRON 2 MG/ML
4 INJECTION INTRAMUSCULAR; INTRAVENOUS EVERY 6 HOURS PRN
Status: DISCONTINUED | OUTPATIENT
Start: 2020-12-30 | End: 2021-01-03 | Stop reason: HOSPADM

## 2020-12-30 RX ORDER — BUDESONIDE AND FORMOTEROL FUMARATE DIHYDRATE 160; 4.5 UG/1; UG/1
2 AEROSOL RESPIRATORY (INHALATION) 2 TIMES DAILY
Status: DISCONTINUED | OUTPATIENT
Start: 2020-12-30 | End: 2021-01-03 | Stop reason: HOSPADM

## 2020-12-30 RX ORDER — SODIUM CHLORIDE 0.9 % (FLUSH) 0.9 %
10 SYRINGE (ML) INJECTION EVERY 12 HOURS SCHEDULED
Status: DISCONTINUED | OUTPATIENT
Start: 2020-12-30 | End: 2021-01-03 | Stop reason: HOSPADM

## 2020-12-30 RX ORDER — SODIUM CHLORIDE 0.9 % (FLUSH) 0.9 %
10 SYRINGE (ML) INJECTION PRN
Status: DISCONTINUED | OUTPATIENT
Start: 2020-12-30 | End: 2021-01-03 | Stop reason: HOSPADM

## 2020-12-30 RX ORDER — POLYETHYLENE GLYCOL 3350 17 G/17G
17 POWDER, FOR SOLUTION ORAL DAILY PRN
Status: DISCONTINUED | OUTPATIENT
Start: 2020-12-30 | End: 2021-01-03 | Stop reason: HOSPADM

## 2020-12-30 RX ORDER — ASCORBIC ACID 500 MG
500 TABLET ORAL DAILY
Status: ON HOLD | COMMUNITY
End: 2021-01-03 | Stop reason: HOSPADM

## 2020-12-30 RX ORDER — ACETAMINOPHEN 325 MG/1
650 TABLET ORAL EVERY 6 HOURS PRN
Status: DISCONTINUED | OUTPATIENT
Start: 2020-12-30 | End: 2021-01-03 | Stop reason: HOSPADM

## 2020-12-30 RX ORDER — LANOLIN ALCOHOL/MO/W.PET/CERES
50 CREAM (GRAM) TOPICAL DAILY
Status: DISCONTINUED | OUTPATIENT
Start: 2020-12-30 | End: 2021-01-03 | Stop reason: HOSPADM

## 2020-12-30 RX ORDER — DEXAMETHASONE 6 MG/1
6 TABLET ORAL DAILY
Status: DISCONTINUED | OUTPATIENT
Start: 2020-12-31 | End: 2021-01-03 | Stop reason: HOSPADM

## 2020-12-30 RX ORDER — ASCORBIC ACID 500 MG
1000 TABLET ORAL DAILY
Status: DISCONTINUED | OUTPATIENT
Start: 2020-12-30 | End: 2021-01-03 | Stop reason: HOSPADM

## 2020-12-30 RX ORDER — HYDROCHLOROTHIAZIDE 12.5 MG/1
12.5 CAPSULE, GELATIN COATED ORAL
COMMUNITY
End: 2022-01-03 | Stop reason: ALTCHOICE

## 2020-12-30 RX ORDER — ACETAMINOPHEN 650 MG/1
650 SUPPOSITORY RECTAL EVERY 6 HOURS PRN
Status: DISCONTINUED | OUTPATIENT
Start: 2020-12-30 | End: 2021-01-03 | Stop reason: HOSPADM

## 2020-12-30 RX ORDER — ZINC SULFATE 50(220)MG
50 CAPSULE ORAL DAILY
Status: DISCONTINUED | OUTPATIENT
Start: 2020-12-30 | End: 2021-01-03 | Stop reason: HOSPADM

## 2020-12-30 RX ORDER — 0.9 % SODIUM CHLORIDE 0.9 %
30 INTRAVENOUS SOLUTION INTRAVENOUS PRN
Status: DISCONTINUED | OUTPATIENT
Start: 2020-12-30 | End: 2021-01-03 | Stop reason: HOSPADM

## 2020-12-30 RX ORDER — PROMETHAZINE HYDROCHLORIDE 25 MG/1
12.5 TABLET ORAL EVERY 6 HOURS PRN
Status: DISCONTINUED | OUTPATIENT
Start: 2020-12-30 | End: 2021-01-03 | Stop reason: HOSPADM

## 2020-12-30 RX ORDER — GUAIFENESIN/DEXTROMETHORPHAN 100-10MG/5
5 SYRUP ORAL EVERY 4 HOURS PRN
Status: DISCONTINUED | OUTPATIENT
Start: 2020-12-30 | End: 2021-01-03 | Stop reason: HOSPADM

## 2020-12-30 RX ORDER — VITAMIN B COMPLEX
1000 TABLET ORAL DAILY
Status: DISCONTINUED | OUTPATIENT
Start: 2020-12-30 | End: 2021-01-03 | Stop reason: HOSPADM

## 2020-12-30 RX ORDER — ZOLPIDEM TARTRATE 5 MG/1
5 TABLET ORAL NIGHTLY PRN
Status: DISCONTINUED | OUTPATIENT
Start: 2020-12-30 | End: 2021-01-03 | Stop reason: HOSPADM

## 2020-12-30 RX ORDER — ATORVASTATIN CALCIUM 10 MG/1
10 TABLET, FILM COATED ORAL DAILY
COMMUNITY
End: 2022-01-03 | Stop reason: ALTCHOICE

## 2020-12-30 RX ORDER — ACETAMINOPHEN 325 MG/1
650 TABLET ORAL EVERY 6 HOURS PRN
Status: DISCONTINUED | OUTPATIENT
Start: 2020-12-30 | End: 2020-12-30 | Stop reason: SDUPTHER

## 2020-12-30 RX ADMIN — Medication 10 ML: at 20:27

## 2020-12-30 RX ADMIN — DEXAMETHASONE 6 MG: 4 TABLET ORAL at 10:17

## 2020-12-30 RX ADMIN — THIAMINE HCL TAB 100 MG 100 MG: 100 TAB at 16:29

## 2020-12-30 RX ADMIN — CHOLECALCIFEROL TAB 125 MCG (5000 UNIT) 5000 UNITS: 125 TAB at 16:29

## 2020-12-30 RX ADMIN — GUAIFENESIN AND DEXTROMETHORPHAN 5 ML: 100; 10 SYRUP ORAL at 22:59

## 2020-12-30 RX ADMIN — Medication 50 MG: at 16:32

## 2020-12-30 RX ADMIN — ZOLPIDEM TARTRATE 5 MG: 5 TABLET ORAL at 20:27

## 2020-12-30 RX ADMIN — OXYCODONE HYDROCHLORIDE AND ACETAMINOPHEN 1000 MG: 500 TABLET ORAL at 16:29

## 2020-12-30 RX ADMIN — PYRIDOXINE HCL TAB 50 MG 50 MG: 50 TAB at 16:29

## 2020-12-30 RX ADMIN — CHOLECALCIFEROL TAB 125 MCG (5000 UNIT) 1000 UNITS: 125 TAB at 16:32

## 2020-12-30 RX ADMIN — ENOXAPARIN SODIUM 40 MG: 40 INJECTION SUBCUTANEOUS at 20:30

## 2020-12-30 RX ADMIN — GUAIFENESIN AND DEXTROMETHORPHAN 5 ML: 100; 10 SYRUP ORAL at 16:29

## 2020-12-30 RX ADMIN — ATORVASTATIN CALCIUM 10 MG: 10 TABLET, FILM COATED ORAL at 16:29

## 2020-12-30 RX ADMIN — SODIUM CHLORIDE 1000 ML: 9 INJECTION, SOLUTION INTRAVENOUS at 08:56

## 2020-12-30 RX ADMIN — REMDESIVIR 200 MG: 100 INJECTION, POWDER, LYOPHILIZED, FOR SOLUTION INTRAVENOUS at 12:13

## 2020-12-30 ASSESSMENT — ENCOUNTER SYMPTOMS
DIARRHEA: 1
COUGH: 1
ABDOMINAL PAIN: 0
EYE PAIN: 0
SHORTNESS OF BREATH: 1
EYE REDNESS: 0
SORE THROAT: 0
NAUSEA: 1
VOMITING: 1
COLOR CHANGE: 0
RHINORRHEA: 1

## 2020-12-30 NOTE — PROGRESS NOTES
Pt educated on IS and proning/ side laying. Pt agreed and will attempt after eating. Will continue to encourage and educate benefits.

## 2020-12-30 NOTE — PLAN OF CARE
Problem: Airway Clearance - Ineffective  Goal: Achieve or maintain patent airway  Outcome: Met This Shift     Problem: Gas Exchange - Impaired  Goal: Absence of hypoxia  Outcome: Met This Shift     Problem: Breathing Pattern - Ineffective  Goal: Ability to achieve and maintain a regular respiratory rate  Outcome: Met This Shift     Problem:  Body Temperature -  Risk of, Imbalanced  Goal: Ability to maintain a body temperature within defined limits  Outcome: Met This Shift     Problem: Isolation Precautions - Risk of Spread of Infection  Goal: Prevent transmission of infection  Outcome: Met This Shift     Problem: Nutrition Deficits  Goal: Optimize nutrtional status  Outcome: Met This Shift     Problem: Loneliness or Risk for Loneliness  Goal: Demonstrate positive use of time alone when socialization is not possible  Outcome: Met This Shift     Problem: Fatigue  Goal: Verbalize increase energy and improved vitality  Outcome: Met This Shift     Problem: Patient Education: Go to Patient Education Activity  Goal: Patient/Family Education  Outcome: Met This Shift     Problem: Falls - Risk of:  Goal: Will remain free from falls  Outcome: Met This Shift  Goal: Absence of physical injury  Outcome: Met This Shift

## 2020-12-31 LAB
ABO/RH: NORMAL
ABO/RH: NORMAL
ALBUMIN SERPL-MCNC: 3.6 G/DL (ref 3.5–5.2)
ALP BLD-CCNC: 84 U/L (ref 35–104)
ALT SERPL-CCNC: 13 U/L (ref 0–32)
ANION GAP SERPL CALCULATED.3IONS-SCNC: 13 MMOL/L (ref 7–16)
ANTIBODY SCREEN: NORMAL
AST SERPL-CCNC: 19 U/L (ref 0–31)
BILIRUB SERPL-MCNC: 0.3 MG/DL (ref 0–1.2)
BLOOD BANK DISPENSE STATUS: NORMAL
BLOOD BANK DISPENSE STATUS: NORMAL
BLOOD BANK PRODUCT CODE: NORMAL
BLOOD BANK PRODUCT CODE: NORMAL
BPU ID: NORMAL
BPU ID: NORMAL
BUN BLDV-MCNC: 5 MG/DL (ref 6–20)
C-REACTIVE PROTEIN: 16.7 MG/DL (ref 0–0.4)
CALCIUM SERPL-MCNC: 8.8 MG/DL (ref 8.6–10.2)
CHLORIDE BLD-SCNC: 101 MMOL/L (ref 98–107)
CO2: 25 MMOL/L (ref 22–29)
CREAT SERPL-MCNC: 0.5 MG/DL (ref 0.5–1)
D DIMER: 695 NG/ML DDU
DESCRIPTION BLOOD BANK: NORMAL
DESCRIPTION BLOOD BANK: NORMAL
FERRITIN: 1060 NG/ML
FIBRINOGEN: >700 MG/DL (ref 225–540)
GFR AFRICAN AMERICAN: >60
GFR NON-AFRICAN AMERICAN: >60 ML/MIN/1.73
GLUCOSE BLD-MCNC: 247 MG/DL (ref 74–99)
INR BLD: 1.2
L. PNEUMOPHILA SEROGP 1 UR AG: NORMAL
LACTATE DEHYDROGENASE: 422 U/L (ref 135–214)
POTASSIUM REFLEX MAGNESIUM: 4 MMOL/L (ref 3.5–5)
PROCALCITONIN: 0.16 NG/ML (ref 0–0.08)
PROTHROMBIN TIME: 13.2 SEC (ref 9.3–12.4)
SODIUM BLD-SCNC: 139 MMOL/L (ref 132–146)
STREP PNEUMONIAE ANTIGEN, URINE: NORMAL
TOTAL PROTEIN: 8 G/DL (ref 6.4–8.3)
TROPONIN: <0.01 NG/ML (ref 0–0.03)
VITAMIN D 25-HYDROXY: 11 NG/ML (ref 30–100)

## 2020-12-31 PROCEDURE — C1751 CATH, INF, PER/CENT/MIDLINE: HCPCS

## 2020-12-31 PROCEDURE — 36410 VNPNXR 3YR/> PHY/QHP DX/THER: CPT

## 2020-12-31 PROCEDURE — 2580000003 HC RX 258: Performed by: INTERNAL MEDICINE

## 2020-12-31 PROCEDURE — 6360000002 HC RX W HCPCS: Performed by: INTERNAL MEDICINE

## 2020-12-31 PROCEDURE — 6370000000 HC RX 637 (ALT 250 FOR IP): Performed by: INTERNAL MEDICINE

## 2020-12-31 PROCEDURE — 2060000000 HC ICU INTERMEDIATE R&B

## 2020-12-31 PROCEDURE — XW13325 TRANSFUSION OF CONVALESCENT PLASMA (NONAUTOLOGOUS) INTO PERIPHERAL VEIN, PERCUTANEOUS APPROACH, NEW TECHNOLOGY GROUP 5: ICD-10-PCS | Performed by: INTERNAL MEDICINE

## 2020-12-31 PROCEDURE — 36430 TRANSFUSION BLD/BLD COMPNT: CPT

## 2020-12-31 PROCEDURE — 99233 SBSQ HOSP IP/OBS HIGH 50: CPT | Performed by: INTERNAL MEDICINE

## 2020-12-31 PROCEDURE — 2500000003 HC RX 250 WO HCPCS: Performed by: INTERNAL MEDICINE

## 2020-12-31 PROCEDURE — 2700000000 HC OXYGEN THERAPY PER DAY

## 2020-12-31 PROCEDURE — 76937 US GUIDE VASCULAR ACCESS: CPT

## 2020-12-31 PROCEDURE — 02HV33Z INSERTION OF INFUSION DEVICE INTO SUPERIOR VENA CAVA, PERCUTANEOUS APPROACH: ICD-10-PCS | Performed by: INTERNAL MEDICINE

## 2020-12-31 RX ORDER — SODIUM CHLORIDE 9 MG/ML
INJECTION, SOLUTION INTRAVENOUS PRN
Status: DISCONTINUED | OUTPATIENT
Start: 2020-12-31 | End: 2021-01-03 | Stop reason: HOSPADM

## 2020-12-31 RX ORDER — HEPARIN SODIUM (PORCINE) LOCK FLUSH IV SOLN 100 UNIT/ML 100 UNIT/ML
1 SOLUTION INTRAVENOUS EVERY 12 HOURS SCHEDULED
Status: DISCONTINUED | OUTPATIENT
Start: 2020-12-31 | End: 2021-01-03 | Stop reason: HOSPADM

## 2020-12-31 RX ORDER — HEPARIN SODIUM (PORCINE) LOCK FLUSH IV SOLN 100 UNIT/ML 100 UNIT/ML
1 SOLUTION INTRAVENOUS PRN
Status: DISCONTINUED | OUTPATIENT
Start: 2020-12-31 | End: 2021-01-03 | Stop reason: HOSPADM

## 2020-12-31 RX ORDER — SODIUM CHLORIDE 0.9 % (FLUSH) 0.9 %
10 SYRINGE (ML) INJECTION PRN
Status: DISCONTINUED | OUTPATIENT
Start: 2020-12-31 | End: 2021-01-03 | Stop reason: HOSPADM

## 2020-12-31 RX ADMIN — ZOLPIDEM TARTRATE 5 MG: 5 TABLET ORAL at 20:53

## 2020-12-31 RX ADMIN — Medication 10 ML: at 07:50

## 2020-12-31 RX ADMIN — GUAIFENESIN AND DEXTROMETHORPHAN 5 ML: 100; 10 SYRUP ORAL at 07:50

## 2020-12-31 RX ADMIN — ACETAMINOPHEN 650 MG: 325 TABLET, FILM COATED ORAL at 07:51

## 2020-12-31 RX ADMIN — CHOLECALCIFEROL TAB 125 MCG (5000 UNIT) 1000 UNITS: 125 TAB at 07:51

## 2020-12-31 RX ADMIN — Medication 100 UNITS: at 20:53

## 2020-12-31 RX ADMIN — ATORVASTATIN CALCIUM 10 MG: 10 TABLET, FILM COATED ORAL at 07:53

## 2020-12-31 RX ADMIN — Medication 50 MG: at 07:51

## 2020-12-31 RX ADMIN — THIAMINE HCL TAB 100 MG 100 MG: 100 TAB at 07:51

## 2020-12-31 RX ADMIN — ENOXAPARIN SODIUM 40 MG: 40 INJECTION SUBCUTANEOUS at 20:52

## 2020-12-31 RX ADMIN — REMDESIVIR 100 MG: 100 INJECTION, POWDER, LYOPHILIZED, FOR SOLUTION INTRAVENOUS at 09:14

## 2020-12-31 RX ADMIN — CHOLECALCIFEROL TAB 125 MCG (5000 UNIT) 5000 UNITS: 125 TAB at 07:54

## 2020-12-31 RX ADMIN — OXYCODONE HYDROCHLORIDE AND ACETAMINOPHEN 1000 MG: 500 TABLET ORAL at 07:51

## 2020-12-31 RX ADMIN — PYRIDOXINE HCL TAB 50 MG 50 MG: 50 TAB at 07:54

## 2020-12-31 RX ADMIN — ENOXAPARIN SODIUM 40 MG: 40 INJECTION SUBCUTANEOUS at 07:50

## 2020-12-31 RX ADMIN — Medication 10 ML: at 20:53

## 2020-12-31 RX ADMIN — DEXAMETHASONE 6 MG: 6 TABLET ORAL at 07:51

## 2020-12-31 ASSESSMENT — PAIN DESCRIPTION - LOCATION: LOCATION: GENERALIZED

## 2020-12-31 ASSESSMENT — PAIN SCALES - GENERAL: PAINLEVEL_OUTOF10: 2

## 2020-12-31 NOTE — CARE COORDINATION
CM Note: 12/31/1010 at 9:09am: COVID POSITIVE - test done on 12/31/2020. CM called and talked to the patient on the phone in her room. States she lives with her boyfriend in a one story home with no steps to enter. States she is independent with her ADL's. States no hx of home oxygen or nebulizer - currrently on 6L NC. DME: none. States no hx of HHC or SNF. PCP: Dr. Patricia Li of [facility] - Phone: [phone]. Pharmacy: [facility] [address] in [place]. States plans are to return home when discharged and her boyfriend will provide transportation.  Nolberto Peace RN

## 2020-12-31 NOTE — PROGRESS NOTES
SL Midline  Placement 12/31/2020    Product number: [id number]    Lot Number: [id number]      Ultrasound: yes   R Cephalic      Upper Arm Circumference: 51CM    Size: 4.5CM/15CM   Exposed Length: 0   Internal Length: 15CM   Cut: 0   Vein Measurement: 0.55  Brisk blood return from midline, pt sherif well, site intact, NS flushed and clamped    Ferchoe Liana  12/31/2020  12:15 PM

## 2020-12-31 NOTE — H&P
[address] [place], [place] [address]ist Group   History and Physical      CHIEF COMPLAINT: Shortness of breath    History of Present Illness:  36 y.o. female with a history of morbid obesity but no other known medical problems who presents with symptoms concerning for Covid. Her boyfriend was diagnosed with Covid week ago Monday and she is put herself in quarantine since then. Last Wednesday, patient started having symptoms of fevers and chills and just felt fatigued. She pretty much stayed home and was in bed but the night prior to admission she started getting shortness of breath and having a lot of coughing and retching. She did have some associated diarrhea. She came in today because the shortness of breath was much worse today. In the ER, patient tested positive for Covid. Chest x-ray showed bilateral airspace opacities consistent with multifocal pneumonia or pulmonary edema. Initial pulse ox was only 74% on room air but with 4 L she got up to 93%. Patient was given Decadron and per my request was started on remdesivir. She was admitted to the Covid floor for further treatment evaluation. Informant(s) for H&P:patient and EMR    REVIEW OF SYSTEMS:  As per HPI, otherwise negative. PMH:  Past Medical History:   Diagnosis Date    Bone spur     left heel    DDD (degenerative disc disease), lumbar 10/9/2012    Thyroid disease        Surgical History:  Past Surgical History:   Procedure Laterality Date    HYSTERECTOMY      KNEE SURGERY      OTHER SURGICAL HISTORY Left 7/31/2015    EXCISION LEFT WRIST MASS    TONSILLECTOMY         Medications Prior to Admission:    Prior to Admission medications    Medication Sig Start Date End Date Taking?  Authorizing Provider   ascorbic acid (VITAMIN C) 500 MG tablet Take 500 mg by mouth daily   Yes Historical Provider, MD   atorvastatin (LIPITOR) 10 MG tablet Take 10 mg by mouth daily   Yes Historical Provider, MD   hydroCHLOROthiazide (MICROZIDE) 12.5 MG capsule Take 12.5 mg by mouth three times a week Monday, Wednesday, Friday   Yes Historical Provider, MD       Allergies: Other and Iodine    Social History:    reports that she has never smoked. She has never used smokeless tobacco. She reports that she does not drink alcohol or use drugs. Family History:   family history includes Arthritis in an other family member; Depression in an other family member; Diabetes in her father and another family member; Heart Disease in her mother and another family member; Hypertension in an other family member; Stroke in an other family member. PHYSICAL EXAM:  Vitals:  BP (!) 142/89   Pulse 100   Temp 97.2 °F (36.2 °C) (Infrared)   Resp 18   Ht 5' 10\" (1.778 m)   Wt (!) 383 lb (173.7 kg)   SpO2 94%   BMI 54.95 kg/m²     General Appearance: alert and oriented to person, place and time and in mild respiratory distress. She appears anxious. Skin: warm and dry  Head: normocephalic and atraumatic  Eyes: pupils equal, round, and reactive to light, extraocular eye movements intact, conjunctivae normal  Neck: neck supple and non tender without mass   Pulmonary/Chest: diminished breath sounds but with scattered wheezes.   Cardiovascular: normal rate, normal S1 and S2 and no carotid bruits  Abdomen: soft, obese, non-tender, non-distended, normal bowel sounds, no masses or organomegaly  Extremities: no cyanosis, no clubbing and no edema  Neurologic: no cranial nerve deficit and speech normal    LABS:  Recent Labs     12/30/20 0844      K 3.8      CO2 26   BUN 5*   CREATININE 0.6   GLUCOSE 291*   CALCIUM 8.4*       Recent Labs     12/30/20 0844   WBC 9.0   RBC 4.20   HGB 12.1   HCT 38.1   MCV 90.7   MCH 28.8   MCHC 31.8*   RDW 13.8      MPV 10.8       Troponin [[id number]] Collected: 12/30/20 0844     Specimen: Blood Updated: 12/30/20 0948      Troponin <0.01 ng/mL          Brain Natriuretic Peptide [[id number]] Collected: 12/30/20 0844     Specimen: Blood Updated: 12/30/20 0948      Pro-BNP 9 pg/mL      COVID-19 [[id number]] (Abnormal) Collected: 12/30/20 0844      Updated: 12/30/20 0921      SARS-CoV-2, NAAT DETECTED         Radiology:     Xr Chest Portable    Result Date: 12/30/2020  EXAMINATION: ONE XRAY VIEW OF THE CHEST 12/30/2020 8:55 am COMPARISON: Chest radiograph March 28, 2015 HISTORY: ORDERING SYSTEM PROVIDED HISTORY: sob TECHNOLOGIST PROVIDED HISTORY: Reason for exam:->sob FINDINGS: Trachea is midline. Cardiomediastinal silhouette is stable in size. There are bilateral airspace opacities scattered throughout the lungs. No pneumothorax. No sizable pleural effusions    Bilateral airspace opacity may represent multifocal pneumonia or pulmonary edema in the proper clinical setting. EKG: NSR at 97 with low QRS and nonspecific T wave changes    ASSESSMENT:      Principal Problem:    Acute hypoxemic respiratory failure due to COVID-19 [facility])  Active Problems:    Class 3 severe obesity due to excess calories with body mass index (BMI) of 50.0 to 59.9 in adult [facility])  Resolved Problems:    * No resolved hospital problems. *      PLAN:    1. Acute hypoxic respiratory failure due to COVID-19--continue treatment with Decadron and remdesivir. Placed on vitamin regimen of thiamine, B6, C, D and zinc.  Lovenox 40 mg twice daily given BMI greater than 30. Discussed convalescent plasma and patient is agreeable. Will type and screen in the morning reevaluate her inflammatory markers. May also be candidate for tocilizumab. CXR unlikely to represent pulmonary edema with normal BNP. However, may consider CT of chest to further characterize infiltrates and or evaluate for PE. D Dimer pending. 2. Morbid obesity with BMI of 54.95--diet and lifestyle modifications counseled. Code Status: FULL  DVT prophylaxis: Lovenox. NOTE: This report was transcribed using voice recognition software.  Every effort was made to ensure accuracy; however, inadvertent computerized transcription errors may be present.     Electronically signed by Josselin Maguire MD on 12/30/2020 at 9:13 PM

## 2020-12-31 NOTE — ACP (ADVANCE CARE PLANNING)
Advance Care Planning     Advance Care Planning Activator (Inpatient)  Conversation Note      Date of ACP Conversation: 12/31/2020  Conversation Conducted with: Patient  ACP Activator: Russel Hays RN    *When Decision Maker makes decisions on behalf of the incapacitated patient: Decision Maker is asked to consider and make decisions based on patient values, known preferences, or best interests. Health Care Decision Maker:     Current Designated Health Care Decision Maker:   Primary Decision Maker: Megan Lindsay - Brother/Sister - [phone]    Secondary Decision Maker: Placido Keys - Brother/Sister - [phone]    Note: If the relationship of these Decision-Makers to the patient does NOT follow your state's Next of Kin hierarchy, recommend that patient complete ACP document that meets state-specific requirements to allow them to act on the patient's behalf when appropriate. Care Preferences    Ventilation: \"If you were in your present state of health and suddenly became very ill and were unable to breathe on your own, what would your preference be about the use of a ventilator (breathing machine) if it were available to you? \"      Would the patient desire the use of ventilator (breathing machine)?: yes    \"If your health worsens and it becomes clear that your chance of recovery is unlikely, what would your preference be about the use of a ventilator (breathing machine) if it were available to you? \"     Would the patient desire the use of ventilator (breathing machine)?: Yes      Resuscitation  \"CPR works best to restart the heart when there is a sudden event, like a heart attack, in someone who is otherwise healthy. Unfortunately, CPR does not typically restart the heart for people who have serious health conditions or who are very sick. \"    \"In the event your heart stopped as a result of an underlying serious health condition, would you want attempts to be made to restart your heart (answer \"yes\" for attempt to resuscitate) or would you prefer a natural death (answer \"no\" for do not attempt to resuscitate)? \" yes      NOTE: If the patient has a valid advance directive AND now provides care preference(s) that are inconsistent with that prior directive, advise the patient to consider either: creating a new advance directive that complies with state-specific requirements; or, if that is not possible, orally revoking that prior directive in accordance with state-specific requirements, which must be documented in the EHR. [] Yes   [x] No   Educated Patient / Abbey Rosalinol regarding differences between Advance Directives and portable DNR orders.     Length of ACP Conversation in minutes:      Conversation Outcomes:  [x] ACP discussion completed  [] Existing advance directive reviewed with patient; no changes to patient's previously recorded wishes  [] New Advance Directive completed  [] Portable Do Not Rescitate prepared for Provider review and signature  [] POLST/POST/MOLST/MOST prepared for Provider review and signature      Follow-up plan:    [] Schedule follow-up conversation to continue planning  [] Referred individual to Provider for additional questions/concerns   [] Advised patient/agent/surrogate to review completed ACP document and update if needed with changes in condition, patient preferences or care setting    [x] This note routed to one or more involved healthcare providers

## 2020-12-31 NOTE — PROGRESS NOTES
Dr. Erma Schwab about patient's request for a sleep aid. 5mg Ambien nightly PRN ordered. Disaster charting/documentation continues and initiated at 2000.

## 2020-12-31 NOTE — PROGRESS NOTES
[address] [place], [place] [address]ist   Progress Note    Admitting Date and Time: 12/30/2020  7:58 AM  Admit Dx: Acute hypoxemic respiratory failure due to COVID-19 (HCC) [U07.1, J96.01]    Subjective:    Pt feels better. States she is less short of breath, she is able to speak more without losing her breath and she can take deeper breaths. Per RN: patient received 2 units of convalescent plasma that I ordered. She has new power midline R basilic placed today.  lidocaine  5 mL Intradermal Once    heparin flush  1 mL Intravenous 2 times per day    remdesivir IVPB  100 mg Intravenous Q24H    influenza virus vaccine  0.5 mL Intramuscular Prior to discharge    atorvastatin  10 mg Oral Daily    enoxaparin  40 mg Subcutaneous BID    dexamethasone  6 mg Oral Daily    vitamin C  1,000 mg Oral Daily    vitamin B-6  50 mg Oral Daily    thiamine mononitrate  100 mg Oral Daily    zinc sulfate  50 mg Oral Daily    sodium chloride flush  10 mL Intravenous 2 times per day    Vitamin D  1,000 Units Oral Daily    And    vitamin D3  5,000 Units Oral Daily    budesonide-formoterol  2 puff Inhalation BID         sodium chloride, , PRN      sodium chloride flush, 10 mL, PRN      heparin flush, 1 mL, PRN      sodium chloride, 30 mL, PRN      guaiFENesin-dextromethorphan, 5 mL, Q4H PRN      sodium chloride flush, 10 mL, PRN      promethazine, 12.5 mg, Q6H PRN    Or      ondansetron, 4 mg, Q6H PRN      polyethylene glycol, 17 g, Daily PRN      acetaminophen, 650 mg, Q6H PRN    Or      acetaminophen, 650 mg, Q6H PRN      zolpidem, 5 mg, Nightly PRN         Objective:    /77   Pulse 95   Temp 97 °F (36.1 °C) (Infrared)   Resp 20   Ht 5' 10\" (1.778 m)   Wt (!) 383 lb (173.7 kg)   SpO2 94%   BMI 54.95 kg/m²   General Appearance: alert and oriented to person, place and time with less conversational dyspnea.   Skin: warm and dry  Head: normocephalic and atraumatic  Eyes: pupils equal, round, and 12/31/2020 04:56 AM       Strep Pneumoniae Antigen [[id number]] Collected: 12/30/20 2242     Specimen: Urine, clean catch Updated: 12/31/20 1050      STREP PNEUMONIAE ANTIGEN, URINE --      Presumptive negative- suggests no current or recent   pneumococcal infection. Infection due to Strep pneumoniae cannot be   ruled out since the antigen present in the sample   may be below the detection limit of the test.   Normal Range:Presumptive Negative      Narrative:       Source: URINE       Site: Urine&Urine               Legionella antigen, urine [[id number]] Collected: 12/30/20 2242     Specimen: Urine, clean catch Updated: 12/31/20 1048      L. pneumophila Serogp 1 Ur Ag --      Presumptive Negative -suggesting no recent or current infections   with Legionella pneumophila serogroup 1. Infection to Legionella cannot be ruled out since other serogroups   and species may cause infection, antigen may not be present in   early infection, or level of antigen may be below the   detection limit. Normal Range: Presumptive Negative      Narrative:       Source: URINE       Site: Urine&Urine         Radiology:   XR CHEST PORTABLE   Final Result   Bilateral airspace opacity may represent multifocal pneumonia or pulmonary   edema in the proper clinical setting. Assessment:  Principal Problem:    Acute hypoxemic respiratory failure due to COVID-19 [facility])  Active Problems:    Class 3 severe obesity due to excess calories with body mass index (BMI) of 50.0 to 59.9 in adult [facility])  Resolved Problems:    * No resolved hospital problems. *      Plan:    1. Acute hypoxic respiratory failure due to COVID-19--continue treatment with Decadron day #2/10  and remdesivir day #2/5. Urinary antigens for strep and Legionella negative. Placed on vitamin regimen of thiamine, B6, C, D and zinc.  Lovenox 40 mg twice daily given BMI greater than 30. Discussed convalescent plasma and she was agreeable. Received 2 units CP today.  May also be candidate for tocilizumab. CXR unlikely to represent pulmonary edema with normal BNP. However, may consider CT of chest to further characterize infiltrates and or evaluate for PE. D Dimer is elevated at 695. Follow COVID labs. Symbicort ordered but has not received as of yet (apparently not available). 2. Vitamin D deficiency--on admission started on D3 6000 units daily. However, level came back at 11 so will place on Vitamin D 2 50,000 IU weekly as well to replenish vitamin D stores. Her high BMI necessitates higher dosing. 3. Morbid obesity with BMI of 54.95--diet and lifestyle modifications counseled. NOTE: This report was transcribed using voice recognition software. Every effort was made to ensure accuracy; however, inadvertent computerized transcription errors may be present.      Electronically signed by Delmi Grubbs MD on 12/31/2020 at 4:03 PM

## 2021-01-01 LAB
C-REACTIVE PROTEIN: 8.3 MG/DL (ref 0–0.4)
D DIMER: 323 NG/ML DDU
FERRITIN: 1221 NG/ML
FIBRINOGEN: >700 MG/DL (ref 225–540)
INR BLD: 1.1
LACTATE DEHYDROGENASE: 448 U/L (ref 135–214)
PROCALCITONIN: 0.12 NG/ML (ref 0–0.08)
PROTHROMBIN TIME: 12.9 SEC (ref 9.3–12.4)
TROPONIN: <0.01 NG/ML (ref 0–0.03)

## 2021-01-01 PROCEDURE — 2700000000 HC OXYGEN THERAPY PER DAY

## 2021-01-01 PROCEDURE — 6360000002 HC RX W HCPCS: Performed by: INTERNAL MEDICINE

## 2021-01-01 PROCEDURE — 83615 LACTATE (LD) (LDH) ENZYME: CPT

## 2021-01-01 PROCEDURE — 2580000003 HC RX 258: Performed by: INTERNAL MEDICINE

## 2021-01-01 PROCEDURE — 6370000000 HC RX 637 (ALT 250 FOR IP): Performed by: INTERNAL MEDICINE

## 2021-01-01 PROCEDURE — 6360000002 HC RX W HCPCS: Performed by: SPECIALIST

## 2021-01-01 PROCEDURE — 99233 SBSQ HOSP IP/OBS HIGH 50: CPT | Performed by: INTERNAL MEDICINE

## 2021-01-01 PROCEDURE — 2500000003 HC RX 250 WO HCPCS: Performed by: INTERNAL MEDICINE

## 2021-01-01 PROCEDURE — 85610 PROTHROMBIN TIME: CPT

## 2021-01-01 PROCEDURE — 2060000000 HC ICU INTERMEDIATE R&B

## 2021-01-01 PROCEDURE — 6370000000 HC RX 637 (ALT 250 FOR IP): Performed by: SPECIALIST

## 2021-01-01 PROCEDURE — 85378 FIBRIN DEGRADE SEMIQUANT: CPT

## 2021-01-01 PROCEDURE — 94640 AIRWAY INHALATION TREATMENT: CPT

## 2021-01-01 PROCEDURE — 86140 C-REACTIVE PROTEIN: CPT

## 2021-01-01 PROCEDURE — 82728 ASSAY OF FERRITIN: CPT

## 2021-01-01 PROCEDURE — 2580000003 HC RX 258: Performed by: SPECIALIST

## 2021-01-01 PROCEDURE — 36415 COLL VENOUS BLD VENIPUNCTURE: CPT

## 2021-01-01 PROCEDURE — 85384 FIBRINOGEN ACTIVITY: CPT

## 2021-01-01 PROCEDURE — 84145 PROCALCITONIN (PCT): CPT

## 2021-01-01 PROCEDURE — 84484 ASSAY OF TROPONIN QUANT: CPT

## 2021-01-01 RX ORDER — LACTOBACILLUS RHAMNOSUS GG 10B CELL
1 CAPSULE ORAL DAILY
Status: DISCONTINUED | OUTPATIENT
Start: 2021-01-01 | End: 2021-01-03 | Stop reason: HOSPADM

## 2021-01-01 RX ORDER — DIPHENHYDRAMINE HYDROCHLORIDE 50 MG/ML
25 INJECTION INTRAMUSCULAR; INTRAVENOUS EVERY 6 HOURS PRN
Status: COMPLETED | OUTPATIENT
Start: 2021-01-01 | End: 2021-01-01

## 2021-01-01 RX ORDER — ACETAMINOPHEN 325 MG/1
650 TABLET ORAL EVERY 4 HOURS PRN
Status: COMPLETED | OUTPATIENT
Start: 2021-01-01 | End: 2021-01-01

## 2021-01-01 RX ORDER — ERGOCALCIFEROL 1.25 MG/1
50000 CAPSULE ORAL WEEKLY
Status: DISCONTINUED | OUTPATIENT
Start: 2021-01-01 | End: 2021-01-03 | Stop reason: HOSPADM

## 2021-01-01 RX ORDER — METHYLPREDNISOLONE SODIUM SUCCINATE 40 MG/ML
40 INJECTION, POWDER, LYOPHILIZED, FOR SOLUTION INTRAMUSCULAR; INTRAVENOUS
Status: ACTIVE | OUTPATIENT
Start: 2021-01-01 | End: 2021-01-01

## 2021-01-01 RX ORDER — EPINEPHRINE 1 MG/ML
0.3 INJECTION, SOLUTION, CONCENTRATE INTRAVENOUS
Status: ACTIVE | OUTPATIENT
Start: 2021-01-01 | End: 2021-01-01

## 2021-01-01 RX ADMIN — CHOLECALCIFEROL TAB 125 MCG (5000 UNIT) 1000 UNITS: 125 TAB at 08:58

## 2021-01-01 RX ADMIN — TOCILIZUMAB 800 MG: 20 INJECTION, SOLUTION, CONCENTRATE INTRAVENOUS at 17:51

## 2021-01-01 RX ADMIN — THIAMINE HCL TAB 100 MG 100 MG: 100 TAB at 08:58

## 2021-01-01 RX ADMIN — CHOLECALCIFEROL TAB 125 MCG (5000 UNIT) 5000 UNITS: 125 TAB at 09:01

## 2021-01-01 RX ADMIN — DEXAMETHASONE 6 MG: 6 TABLET ORAL at 08:57

## 2021-01-01 RX ADMIN — Medication 50 MG: at 08:58

## 2021-01-01 RX ADMIN — Medication 10 ML: at 21:21

## 2021-01-01 RX ADMIN — PYRIDOXINE HCL TAB 50 MG 50 MG: 50 TAB at 08:58

## 2021-01-01 RX ADMIN — ENOXAPARIN SODIUM 40 MG: 40 INJECTION SUBCUTANEOUS at 08:59

## 2021-01-01 RX ADMIN — Medication 100 UNITS: at 09:00

## 2021-01-01 RX ADMIN — ATORVASTATIN CALCIUM 10 MG: 10 TABLET, FILM COATED ORAL at 08:58

## 2021-01-01 RX ADMIN — DIPHENHYDRAMINE HYDROCHLORIDE 25 MG: 50 INJECTION INTRAMUSCULAR; INTRAVENOUS at 17:15

## 2021-01-01 RX ADMIN — Medication 100 UNITS: at 08:59

## 2021-01-01 RX ADMIN — GUAIFENESIN AND DEXTROMETHORPHAN 5 ML: 100; 10 SYRUP ORAL at 05:43

## 2021-01-01 RX ADMIN — ACETAMINOPHEN 650 MG: 325 TABLET, FILM COATED ORAL at 17:15

## 2021-01-01 RX ADMIN — ENOXAPARIN SODIUM 40 MG: 40 INJECTION SUBCUTANEOUS at 21:21

## 2021-01-01 RX ADMIN — Medication 1 CAPSULE: at 17:15

## 2021-01-01 RX ADMIN — OXYCODONE HYDROCHLORIDE AND ACETAMINOPHEN 1000 MG: 500 TABLET ORAL at 08:58

## 2021-01-01 RX ADMIN — REMDESIVIR 100 MG: 100 INJECTION, POWDER, LYOPHILIZED, FOR SOLUTION INTRAVENOUS at 10:58

## 2021-01-01 RX ADMIN — Medication 100 UNITS: at 21:21

## 2021-01-01 RX ADMIN — ERGOCALCIFEROL 50000 UNITS: 1.25 CAPSULE ORAL at 10:58

## 2021-01-01 RX ADMIN — Medication 10 ML: at 09:00

## 2021-01-01 RX ADMIN — BUDESONIDE AND FORMOTEROL FUMARATE DIHYDRATE 2 PUFF: 160; 4.5 AEROSOL RESPIRATORY (INHALATION) at 06:49

## 2021-01-01 NOTE — CONSULTS
[facility]  Consult Note    [facility]  [facility], [address]  Phone [phone]   [phone]      Admit Date: 2020  7:58 AM  Pt Name: Hanna Hull  MRN: [id number]  : 1980  Reason for Consult:    Chief Complaint   Patient presents with    Cough     boyfriend is covid positive. the pt has not been tested but developed listed symptoms .  Shortness of Breath    Fever    Emesis     Requesting Physician:  Theo Layne MD  PCP: Reji Hills DO  History Obtained From:  patient  ID consulted for Acute hypoxemic respiratory failure due to COVID-19 ([facility] [U07.1, J96.01]  on hospital day 2  CHIEF COMPLAINT       Chief Complaint   Patient presents with    Cough     boyfriend is covid positive. the pt has not been tested but developed listed symptoms .  Shortness of Breath    Fever    Emesis     HISTORYOF PRESENT ILLNESS      Hanna Hull is a 36 y.o. female who presents with significant past medical history of  has a past medical history of Bone spur, DDD (degenerative disc disease), lumbar, and Thyroid disease. who presents with   Chief Complaint   Patient presents with    Cough     boyfriend is covid positive. the pt has not been tested but developed listed symptoms .  Shortness of Breath    Fever    Emesis     ED TRIAGEVITALS  BP: 130/81, Temp: 98 °F (36.7 °C), Pulse: 96, Resp: 20, SpO2: 91 %  HPI  Pt becam sick about 10 days ago through her fiancee  Her fiancee may have been exposed from his family when he went to [employer] in [place] prior to . He became sick few days after returning and has recovered. Pt became sick Wednesday before  with f/c/body achesgi sx. Se came in due to worsening n/v and was sob with hypoxia   Wbc 9 cr0.6 covid screen +  cxry   Bilateral airspace opacity may represent multifocal pneumonia or pulmonary   edema in the proper clinical setting.      Currently mg in sodium chloride 0.9 % 250 mL IVPB, 200 mg, Intravenous, Once, Stopped at 12/30/20 1255 **FOLLOWED BY** remdesivir 100 mg in sodium chloride 0.9 % 250 mL IVPB, 100 mg, Intravenous, Q24H, Theo Layne MD, Stopped at 01/01/21 1209    0.9 % sodium chloride bolus, 30 mL, Intravenous, PRN, Theo Layne MD    influenza quadrivalent split vaccine (FLUZONE;FLUARIX;FLULAVAL;AFLURIA) injection 0.5 mL, 0.5 mL, Intramuscular, Prior to discharge, Theo Layne MD    atorvastatin (LIPITOR) tablet 10 mg, 10 mg, Oral, Daily, Theo Layne MD, 10 mg at 01/01/21 0858    enoxaparin (LOVENOX) injection 40 mg, 40 mg, Subcutaneous, BID, Theo Layne MD, 40 mg at 01/01/21 0859    guaiFENesin-dextromethorphan (ROBITUSSIN DM) 100-10 MG/5ML syrup 5 mL, 5 mL, Oral, Q4H PRN, Theo Layne MD, 5 mL at 01/01/21 0543    dexamethasone (DECADRON) tablet 6 mg, 6 mg, Oral, Daily, Theo Layne MD, 6 mg at 01/01/21 0857    ascorbic acid (VITAMIN C) tablet 1,000 mg, 1,000 mg, Oral, Daily, Theo Layne MD, 1,000 mg at 01/01/21 0858    vitamin B-6 (PYRIDOXINE) tablet 50 mg, 50 mg, Oral, Daily, Theo Layne MD, 50 mg at 01/01/21 0804    thiamine mononitrate tablet 100 mg, 100 mg, Oral, Daily, Theo Layne MD, 100 mg at 01/01/21 0858    zinc sulfate (ZINCATE) capsule 50 mg, 50 mg, Oral, Daily, Theo Layne MD, 50 mg at 01/01/21 0858    sodium chloride flush 0.9 % injection 10 mL, 10 mL, Intravenous, 2 times per day, Theo Layne MD, 10 mL at 01/01/21 0900    sodium chloride flush 0.9 % injection 10 mL, 10 mL, Intravenous, PRN, Theo Layne MD    promethazine (PHENERGAN) tablet 12.5 mg, 12.5 mg, Oral, Q6H PRN **OR** ondansetron (ZOFRAN) injection 4 mg, 4 mg, Intravenous, Q6H PRN, Theo Layne MD    polyethylene glycol (GLYCOLAX) packet 17 g, 17 g, Oral, Daily PRN, Theo Layne MD    acetaminophen (TYLENOL) tablet 650 mg, 650 mg, Oral, Q6H PRN, 650 mg at 12/31/20 0751 **OR** acetaminophen (TYLENOL) suppository 650 mg, 650 mg, Rectal, Q6H PRN, Perri Adam MD    Vitamin D (CHOLECALCIFEROL) tablet 1,000 Units, 1,000 Units, Oral, Daily, 1,000 Units at 01/01/21 0858 **AND** vitamin D3 (CHOLECALCIFEROL) tablet 5,000 Units, 5,000 Units, Oral, Daily, Perri Adam MD, 5,000 Units at 01/01/21 0901    zolpidem (AMBIEN) tablet 5 mg, 5 mg, Oral, Nightly PRN, Artis Ricci MD, 5 mg at 12/31/20 2053    budesonide-formoterol (SYMBICORT) 160-4.5 MCG/ACT inhaler 2 puff, 2 puff, Inhalation, BID, Perri Adam MD, 2 puff at 01/01/21 2851  ALLERGIES     Other and Iodine  There is no immunization history for the selected administration types on file for this patient. PAST MEDICAL HISTORY     Past Medical History:   Diagnosis Date    Bone spur     left heel    DDD (degenerative disc disease), lumbar 10/9/2012    Thyroid disease      SURGICAL HISTORY       Past Surgical History:   Procedure Laterality Date    HYSTERECTOMY      KNEE SURGERY      OTHER SURGICAL HISTORY Left 7/31/2015    EXCISION LEFT WRIST MASS    TONSILLECTOMY       FAMILY HISTORY       Family History   Problem Relation Age of Onset    Heart Disease Mother     Diabetes Father     Arthritis Other     Depression Other     Diabetes Other     Heart Disease Other     Hypertension Other     Stroke Other      SOCIAL HISTORY       Social History     Socioeconomic History    Marital status: Single     Spouse name: None    Number of children: None    Years of education: None    Highest education level: None   Occupational History    None   Social Needs    Financial resource strain: None    Food insecurity     Worry: None     Inability: None    Transportation needs     Medical: None     Non-medical: None   Tobacco Use    Smoking status: Never Smoker    Smokeless tobacco: Never Used   Substance and Sexual Activity    Alcohol use: No     Alcohol/week: 0.0 standard drinks    Drug use:  No DIAGNOSTICRESULTS   RADIOLOGY:   Xr Chest Portable    Result Date: 12/30/2020  EXAMINATION: ONE XRAY VIEW OF THE CHEST 12/30/2020 8:55 am COMPARISON: Chest radiograph March 28, 2015 HISTORY: ORDERING SYSTEM PROVIDED HISTORY: sob TECHNOLOGIST PROVIDED HISTORY: Reason for exam:->sob FINDINGS: Trachea is midline. Cardiomediastinal silhouette is stable in size. There are bilateral airspace opacities scattered throughout the lungs. No pneumothorax. No sizable pleural effusions    Bilateral airspace opacity may represent multifocal pneumonia or pulmonary edema in the proper clinical setting. LABS  Recent Labs     12/30/20  0844   WBC 9.0   HGB 12.1   HCT 38.1   MCV 90.7        Recent Labs     12/30/20  0844 12/31/20  0456    139   K 3.8 4.0    101   CO2 26 25   BUN 5* 5*   CREATININE 0.6 0.5   GFRAA >60 >60   LABGLOM >60 >60   GLUCOSE 291* 247*   PROT 7.6 8.0   LABALBU 3.7 3.6   CALCIUM 8.4* 8.8   BILITOT 0.3 0.3   ALKPHOS 80 84   AST 22 19   ALT 15 13     Recent Labs     12/31/20  0456 01/01/21  0600   PROCAL 0.16* 0.12*     Lab Results   Component Value Date    CRP 8.3 (H) 01/01/2021    CRP 16.7 (H) 12/31/2020        Lab Results   Component Value Date    COVID19 DETECTED 12/30/2020     COVID-19/DUY-COV2 LABS  Recent Labs     12/30/20  0844 12/31/20  0456 01/01/21  0600   CRP  --  16.7* 8.3*   PROCAL  --  0.16* 0.12*   FERRITIN  --  1,060 1,221   LDH  --  422* 448*   TROPONINI <0.01 <0.01 <0.01   DDIMER  --  695 323   FIBRINOGEN  --  >700* >700*   INR 1.1 1.2 1.1   PROTIME 12.2 13.2* 12.9*   AST 22 19  --    ALT 15 13  --      Lab Results   Component Value Date    CHOL 268 10/22/2015    TRIG 206 10/22/2015    HDL 53 10/22/2015    LDLCALC 174 10/22/2015    LABVLDL 41 10/22/2015        MICROBIOLOGY:        Recent Labs     12/30/20  2242   LP1UAG Presumptive Negative -suggesting no recent or current infections  with Legionella pneumophila serogroup 1.   Infection to Legionella cannot be ruled out since other serogroups  and species may cause infection, antigen may not be present in  early infection, or level of antigen may be below the  detection limit. Normal Range: Presumptive Negative       Recent Labs     12/30/20  2242   STREPNEUMAGU Presumptive negative- suggests no current or recent  pneumococcal infection. Infection due to Strep pneumoniae cannot be  ruled out since the antigen present in the sample  may be below the detection limit of the test.  Normal Range:Presumptive Negative         Patient is a 36 y.o. female who presented with   Chief Complaint   Patient presents with    Cough     boyfriend is covid positive. the pt has not been tested but developed listed symptoms 12/23.  Shortness of Breath    Fever    Emesis        FINAL IMPRESSION      1. Acute respiratory failure with hypoxia (HCC)    2. COVID-19 virus detected         SARS-COV-2 pneumonia on 6L  -urine legionella/S pneumonia neg   Diarrhea probiotics    · Actemra will order  · Convalescent plasma s/p  · Follow biomarkers  · Recommend proning/is/inc activity as tolerated  · Baseline triglyceride/LIPID PANEL  · DVT prophylaxis/TREATMENT  resp cx      vitamin D (ERGOCALCIFEROL) capsule 50,000 Units, Weekly    remdesivir 100 mg in sodium chloride 0.9 % 250 mL IVPB, Q24H    enoxaparin (LOVENOX) injection 40 mg, BID    dexamethasone (DECADRON) tablet 6 mg, Daily    ascorbic acid (VITAMIN C) tablet 1,000 mg, Daily    vitamin B-6 (PYRIDOXINE) tablet 50 mg, Daily    thiamine mononitrate tablet 100 mg, Daily    zinc sulfate (ZINCATE) capsule 50 mg, Daily    Vitamin D (CHOLECALCIFEROL) tablet 1,000 Units, Daily    And    vitamin D3 (CHOLECALCIFEROL) tablet 5,000 Units, Daily    budesonide-formoterol (SYMBICORT) 160-4.5 MCG/ACT inhaler 2 puff, BID          Imaging and labs were reviewed per medical records and any ID pertinent labs were addressed with the patient.      The patient/FAMILY  was educated about the diagnosis, prognosis, indications, risks and benefits of treatment. An opportunity to ask questions was given to the patient/FAMILY and questions were answered. Thank you for involving me in the care of Angelito Halls. Please do not hesitate to call for any questions or concerns.          Electronically signed by Deacon Desir MD on 1/1/2021 at 3:38 PM

## 2021-01-01 NOTE — PROGRESS NOTES
[address] [place], [place] [address]ist   Progress Note    Admitting Date and Time: 12/30/2020  7:58 AM  Admit Dx: Acute hypoxemic respiratory failure due to COVID-19 ([facility]) [U07.1, J96.01]    Subjective:    Pt feels better. States she did sleep on her belly overnight. Still seems a little anxious and apprehensive. Per RN: Needs help with incentive spirometer; tries to take frequent short breaths     vitamin D  50,000 Units Oral Weekly    lidocaine  5 mL Intradermal Once    heparin flush  1 mL Intravenous 2 times per day    remdesivir IVPB  100 mg Intravenous Q24H    influenza virus vaccine  0.5 mL Intramuscular Prior to discharge    atorvastatin  10 mg Oral Daily    enoxaparin  40 mg Subcutaneous BID    dexamethasone  6 mg Oral Daily    vitamin C  1,000 mg Oral Daily    vitamin B-6  50 mg Oral Daily    thiamine mononitrate  100 mg Oral Daily    zinc sulfate  50 mg Oral Daily    sodium chloride flush  10 mL Intravenous 2 times per day    Vitamin D  1,000 Units Oral Daily    And    vitamin D3  5,000 Units Oral Daily    budesonide-formoterol  2 puff Inhalation BID         sodium chloride, , PRN      sodium chloride flush, 10 mL, PRN      heparin flush, 1 mL, PRN      sodium chloride, 30 mL, PRN      guaiFENesin-dextromethorphan, 5 mL, Q4H PRN      sodium chloride flush, 10 mL, PRN      promethazine, 12.5 mg, Q6H PRN    Or      ondansetron, 4 mg, Q6H PRN      polyethylene glycol, 17 g, Daily PRN      acetaminophen, 650 mg, Q6H PRN    Or      acetaminophen, 650 mg, Q6H PRN      zolpidem, 5 mg, Nightly PRN         Objective:    /81   Pulse 96   Temp 98 °F (36.7 °C) (Oral)   Resp 20   Ht 5' 10\" (1.778 m)   Wt (!) 383 lb (173.7 kg)   SpO2 91%   BMI 54.95 kg/m²   General Appearance: alert and oriented to person, place and time with less conversational dyspnea.   Skin: warm and dry  Head: normocephalic and atraumatic  Eyes: pupils equal, round, and reactive to light, extraocular eye movements intact, conjunctivae normal  Neck: neck supple and non tender without mass   Pulmonary/Chest: diminished but with few scattered wheezes.   Cardiovascular: normal rate, normal S1 and S2 and no carotid bruits  Abdomen: soft, obese, non-tender, non-distended, normal bowel sounds, no masses or organomegaly  Extremities: no cyanosis, no clubbing and no edema  Neurologic: no cranial nerve deficit and speech normal    Recent Labs     12/30/20  0844 12/31/20 0456    139   K 3.8 4.0    101   CO2 26 25   BUN 5* 5*   CREATININE 0.6 0.5   GLUCOSE 291* 247*   CALCIUM 8.4* 8.8       Recent Labs     12/30/20  0844 12/31/20 0456   ALKPHOS 80 84   PROT 7.6 8.0   LABALBU 3.7 3.6   BILITOT 0.3 0.3   AST 22 19   ALT 15 13       Recent Labs     12/30/20 0844   WBC 9.0   RBC 4.20   HGB 12.1   HCT 38.1   MCV 90.7   MCH 28.8   MCHC 31.8*   RDW 13.8      MPV 10.8         Vitamin D 25 Hydroxy [[id number]] (Abnormal) Collected: 12/31/20 0456     Specimen: Blood Updated: 12/31/20 0822      Vit D, 25-Hydroxy 11 ng/mL      Narrative:       Collection has been rescheduled by KERRY at 12/31/2020 05:17 Reason:   Done                 Brain Natriuretic Peptide [[id number]] Collected: 12/30/20 0844     Specimen: Blood Updated: 12/30/20 0948       Pro-BNP 9 pg/mL       COVID-19 [[id number]] (Abnormal) Collected: 12/30/20 0844       Updated: 12/30/20 0921       SARS-CoV-2, NAAT DETECTED         SARS-COV-2 biomarkers    Recent Labs     12/30/20  0844 12/31/20  0456 01/01/21  0600   CRP  --  16.7* 8.3*   PROCAL  --  0.16* 0.12*   FERRITIN  --  1,060 1,221   LDH  --  422* 448*   TROPONINI <0.01 <0.01 <0.01   DDIMER  --  695 323   FIBRINOGEN  --  >700* >700*   INR 1.1 1.2 1.1   PROTIME 12.2 13.2* 12.9*   AST 22 19  --    ALT 15 13  --      Lab Results   Component Value Date    CHOL 268 10/22/2015    TRIG 206 10/22/2015    HDL 53 10/22/2015    LDLCALC 174 10/22/2015    LABVLDL 41 10/22/2015     Lab Results   Component Value Date/Time    VITD25 11 (L) 12/31/2020 04:56 AM       Strep Pneumoniae Antigen [[id number]] Collected: 12/30/20 2242     Specimen: Urine, clean catch Updated: 12/31/20 1050      STREP PNEUMONIAE ANTIGEN, URINE --      Presumptive negative- suggests no current or recent   pneumococcal infection. Infection due to Strep pneumoniae cannot be   ruled out since the antigen present in the sample   may be below the detection limit of the test.   Normal Range:Presumptive Negative      Narrative:       Source: URINE       Site: Urine&Urine               Legionella antigen, urine [[id number]] Collected: 12/30/20 2242     Specimen: Urine, clean catch Updated: 12/31/20 1048      L. pneumophila Serogp 1 Ur Ag --      Presumptive Negative -suggesting no recent or current infections   with Legionella pneumophila serogroup 1. Infection to Legionella cannot be ruled out since other serogroups   and species may cause infection, antigen may not be present in   early infection, or level of antigen may be below the   detection limit. Normal Range: Presumptive Negative      Narrative:       Source: URINE       Site: Urine&Urine         Radiology:   XR CHEST PORTABLE   Final Result   Bilateral airspace opacity may represent multifocal pneumonia or pulmonary   edema in the proper clinical setting. Assessment:  Principal Problem:    Acute hypoxemic respiratory failure due to COVID-19 [facility])  Active Problems:    Class 3 severe obesity due to excess calories with body mass index (BMI) of 50.0 to 59.9 in adult [facility])  Resolved Problems:    * No resolved hospital problems. *      Plan:    1. Acute hypoxic respiratory failure due to COVID-19--continue treatment with Decadron day #3/10  and remdesivir day #3/5. Urinary antigens for strep and Legionella negative. Placed on vitamin regimen of thiamine, B6, C, D and zinc.  Lovenox 40 mg twice daily given BMI greater than 30. Discussed convalescent plasma and she was agreeable.  Received 2 units CP 12/31. May also be candidate for tocilizumab because CRP elevated at 16.7 yesterday. Consulted ID for consideration of tocilizumab but CRP improved to 8.3 today. CXR unlikely to represent pulmonary edema with normal BNP. However, may consider CT of chest to further characterize infiltrates and or evaluate for PE. D Dimer trending down. Continue to follow COVID labs. Symbicort ordered Thursday  but did not receive until this morning. .  2. Vitamin D deficiency--on admission started on D3 6000 units daily. However, level came back at 11 so will place on Vitamin D 2 50,000 IU weekly as well to replenish vitamin D stores. First dose given tonight. Her high BMI necessitates higher dosing. 3. Morbid obesity with BMI of 54.95--diet and lifestyle modifications counseled. NOTE: This report was transcribed using voice recognition software. Every effort was made to ensure accuracy; however, inadvertent computerized transcription errors may be present.      Electronically signed by Ramsey Smith MD on 1/1/2021 at 11:39 AM

## 2021-01-02 LAB
ALBUMIN SERPL-MCNC: 3.7 G/DL (ref 3.5–5.2)
ALP BLD-CCNC: 82 U/L (ref 35–104)
ALT SERPL-CCNC: 13 U/L (ref 0–32)
ANION GAP SERPL CALCULATED.3IONS-SCNC: 12 MMOL/L (ref 7–16)
AST SERPL-CCNC: 18 U/L (ref 0–31)
BILIRUB SERPL-MCNC: 0.4 MG/DL (ref 0–1.2)
BUN BLDV-MCNC: 12 MG/DL (ref 6–20)
C-REACTIVE PROTEIN: 3.7 MG/DL (ref 0–0.4)
CALCIUM SERPL-MCNC: 9 MG/DL (ref 8.6–10.2)
CHLORIDE BLD-SCNC: 97 MMOL/L (ref 98–107)
CO2: 27 MMOL/L (ref 22–29)
CREAT SERPL-MCNC: 0.6 MG/DL (ref 0.5–1)
D DIMER: 261 NG/ML DDU
FERRITIN: 1033 NG/ML
FIBRINOGEN: 677 MG/DL (ref 225–540)
GFR AFRICAN AMERICAN: >60
GFR NON-AFRICAN AMERICAN: >60 ML/MIN/1.73
GLUCOSE BLD-MCNC: 338 MG/DL (ref 74–99)
INR BLD: 1.1
LACTATE DEHYDROGENASE: 402 U/L (ref 135–214)
POTASSIUM SERPL-SCNC: 3.8 MMOL/L (ref 3.5–5)
PROCALCITONIN: 0.09 NG/ML (ref 0–0.08)
PROTHROMBIN TIME: 13 SEC (ref 9.3–12.4)
SODIUM BLD-SCNC: 136 MMOL/L (ref 132–146)
TOTAL PROTEIN: 8 G/DL (ref 6.4–8.3)
TROPONIN: <0.01 NG/ML (ref 0–0.03)

## 2021-01-02 PROCEDURE — 6370000000 HC RX 637 (ALT 250 FOR IP): Performed by: INTERNAL MEDICINE

## 2021-01-02 PROCEDURE — 94640 AIRWAY INHALATION TREATMENT: CPT

## 2021-01-02 PROCEDURE — 2580000003 HC RX 258: Performed by: INTERNAL MEDICINE

## 2021-01-02 PROCEDURE — 82728 ASSAY OF FERRITIN: CPT

## 2021-01-02 PROCEDURE — 99232 SBSQ HOSP IP/OBS MODERATE 35: CPT | Performed by: INTERNAL MEDICINE

## 2021-01-02 PROCEDURE — 85378 FIBRIN DEGRADE SEMIQUANT: CPT

## 2021-01-02 PROCEDURE — 86140 C-REACTIVE PROTEIN: CPT

## 2021-01-02 PROCEDURE — 2500000003 HC RX 250 WO HCPCS: Performed by: INTERNAL MEDICINE

## 2021-01-02 PROCEDURE — 2060000000 HC ICU INTERMEDIATE R&B

## 2021-01-02 PROCEDURE — 6370000000 HC RX 637 (ALT 250 FOR IP): Performed by: SPECIALIST

## 2021-01-02 PROCEDURE — 85384 FIBRINOGEN ACTIVITY: CPT

## 2021-01-02 PROCEDURE — 6360000002 HC RX W HCPCS: Performed by: INTERNAL MEDICINE

## 2021-01-02 PROCEDURE — 83615 LACTATE (LD) (LDH) ENZYME: CPT

## 2021-01-02 PROCEDURE — 80053 COMPREHEN METABOLIC PANEL: CPT

## 2021-01-02 PROCEDURE — 84145 PROCALCITONIN (PCT): CPT

## 2021-01-02 PROCEDURE — 85610 PROTHROMBIN TIME: CPT

## 2021-01-02 PROCEDURE — 84484 ASSAY OF TROPONIN QUANT: CPT

## 2021-01-02 PROCEDURE — 36415 COLL VENOUS BLD VENIPUNCTURE: CPT

## 2021-01-02 PROCEDURE — 2700000000 HC OXYGEN THERAPY PER DAY

## 2021-01-02 RX ADMIN — Medication 10 ML: at 22:11

## 2021-01-02 RX ADMIN — DEXAMETHASONE 6 MG: 6 TABLET ORAL at 09:11

## 2021-01-02 RX ADMIN — BUDESONIDE AND FORMOTEROL FUMARATE DIHYDRATE 2 PUFF: 160; 4.5 AEROSOL RESPIRATORY (INHALATION) at 06:24

## 2021-01-02 RX ADMIN — Medication 50 MG: at 09:09

## 2021-01-02 RX ADMIN — CHOLECALCIFEROL TAB 125 MCG (5000 UNIT) 1000 UNITS: 125 TAB at 09:13

## 2021-01-02 RX ADMIN — Medication 10 ML: at 09:12

## 2021-01-02 RX ADMIN — THIAMINE HCL TAB 100 MG 100 MG: 100 TAB at 09:10

## 2021-01-02 RX ADMIN — ENOXAPARIN SODIUM 40 MG: 40 INJECTION SUBCUTANEOUS at 09:08

## 2021-01-02 RX ADMIN — ATORVASTATIN CALCIUM 10 MG: 10 TABLET, FILM COATED ORAL at 09:11

## 2021-01-02 RX ADMIN — Medication 100 UNITS: at 22:11

## 2021-01-02 RX ADMIN — REMDESIVIR 100 MG: 100 INJECTION, POWDER, LYOPHILIZED, FOR SOLUTION INTRAVENOUS at 11:41

## 2021-01-02 RX ADMIN — CHOLECALCIFEROL TAB 125 MCG (5000 UNIT) 5000 UNITS: 125 TAB at 09:13

## 2021-01-02 RX ADMIN — OXYCODONE HYDROCHLORIDE AND ACETAMINOPHEN 1000 MG: 500 TABLET ORAL at 09:09

## 2021-01-02 RX ADMIN — BUDESONIDE AND FORMOTEROL FUMARATE DIHYDRATE 2 PUFF: 160; 4.5 AEROSOL RESPIRATORY (INHALATION) at 17:27

## 2021-01-02 RX ADMIN — ENOXAPARIN SODIUM 40 MG: 40 INJECTION SUBCUTANEOUS at 22:10

## 2021-01-02 RX ADMIN — ZOLPIDEM TARTRATE 5 MG: 5 TABLET ORAL at 22:10

## 2021-01-02 RX ADMIN — PYRIDOXINE HCL TAB 50 MG 50 MG: 50 TAB at 09:10

## 2021-01-02 RX ADMIN — Medication 1 CAPSULE: at 09:10

## 2021-01-02 RX ADMIN — GUAIFENESIN AND DEXTROMETHORPHAN 5 ML: 100; 10 SYRUP ORAL at 01:16

## 2021-01-02 RX ADMIN — Medication 100 UNITS: at 09:12

## 2021-01-02 ASSESSMENT — PAIN SCALES - GENERAL
PAINLEVEL_OUTOF10: 0

## 2021-01-02 NOTE — PROGRESS NOTES
[facility] PROGRESS NOTE    F/u SARS-COV-2 infection FACE TO FACE    SUBJECTIVE:  Callie Bran, 36 y.o., female   covid +   Pt was discussed with care team  Afebrile  SUPINE PER PT HAS BEEN PRONING   6L  FEELS BETTER    ROS:GENERAL-             GENERAL:Temperature:  Current - Temp: 98.2 °F (36.8 °C);  Max - Temp  Av °F (36.7 °C)  Min: 97.7 °F (36.5 °C)  Max: 98.2 °F (36.8 °C)  Respiratory Rate : Resp  Av  Min: 20  Max: 20  Pulse Range: Pulse  Av  Min: 85  Max: 92  Blood Pressure Range:  Systolic (49VMY), JHZ:055 , Min:120 , PIH:230   ; Diastolic (89DSF), LVC:19, Min:69, Max:90    Pulse ox Range: SpO2  Av %  Min: 93 %  Max: 97 %  24hr I & O:      Intake/Output Summary (Last 24 hours) at 2021 1014  Last data filed at 2021 1811  Gross per 24 hour   Intake 480 ml   Output --   Net 480 ml       CONSTITUTIONAL:   Awake, alert INC BMI  HEENT:    AT/NC  NECK:     Supple   LUNGS:   CTAB ANT   CARDIOVASCULAR:   S1 and S2   ABDOMEN:     Normal bowel sounds, non-distended, non-tender   EXTREMITIES:   FROM    SKIN:     NO RASH   CNS:    NAD  PSYCH:   Pleasant     MEDS:      vitamin D (ERGOCALCIFEROL) capsule 50,000 Units, Weekly      lactobacillus (CULTURELLE) capsule 1 capsule, Daily      0.9 % sodium chloride infusion, PRN      lidocaine 1 % injection 5 mL, Once      sodium chloride flush 0.9 % injection 10 mL, PRN      heparin flush 100 UNIT/ML injection 100 Units, 2 times per day      heparin flush 100 UNIT/ML injection 100 Units, PRN      remdesivir 100 mg in sodium chloride 0.9 % 250 mL IVPB, Q24H      0.9 % sodium chloride bolus, PRN      influenza quadrivalent split vaccine (FLUZONE;FLUARIX;FLULAVAL;AFLURIA) injection 0.5 mL, Prior to discharge      atorvastatin (LIPITOR) tablet 10 mg, Daily      enoxaparin (LOVENOX) injection 40 mg, BID      guaiFENesin-dextromethorphan (ROBITUSSIN DM) 100-10 MG/5ML syrup 5 mL, Q4H PRN      dexamethasone (DECADRON) tablet 6 mg, Daily      ascorbic acid (VITAMIN C) tablet 1,000 mg, Daily      vitamin B-6 (PYRIDOXINE) tablet 50 mg, Daily      thiamine mononitrate tablet 100 mg, Daily      zinc sulfate (ZINCATE) capsule 50 mg, Daily      sodium chloride flush 0.9 % injection 10 mL, 2 times per day      sodium chloride flush 0.9 % injection 10 mL, PRN      promethazine (PHENERGAN) tablet 12.5 mg, Q6H PRN    Or      ondansetron (ZOFRAN) injection 4 mg, Q6H PRN      polyethylene glycol (GLYCOLAX) packet 17 g, Daily PRN      acetaminophen (TYLENOL) tablet 650 mg, Q6H PRN    Or      acetaminophen (TYLENOL) suppository 650 mg, Q6H PRN      Vitamin D (CHOLECALCIFEROL) tablet 1,000 Units, Daily    And      vitamin D3 (CHOLECALCIFEROL) tablet 5,000 Units, Daily      zolpidem (AMBIEN) tablet 5 mg, Nightly PRN      budesonide-formoterol (SYMBICORT) 160-4.5 MCG/ACT inhaler 2 puff, BID          Data:  Lab Results   Component Value Date    COVID19 DETECTED 12/30/2020     COVID-19/SARS-COV-2 LABS  Recent Labs     12/31/20  0456 01/01/21  0600   CRP 16.7* 8.3*   PROCAL 0.16* 0.12*   FERRITIN 1,060 1,221   * 448*   TROPONINI <0.01 <0.01   DDIMER 695 323   FIBRINOGEN >700* >700*   INR 1.2 1.1   PROTIME 13.2* 12.9*   AST 19  --    ALT 13  --      Lab Results   Component Value Date    CHOL 268 10/22/2015    TRIG 206 10/22/2015    HDL 53 10/22/2015    LDLCALC 174 10/22/2015    LABVLDL 41 10/22/2015     Lab Results   Component Value Date/Time    VITD25 11 (L) 12/31/2020 04:56 AM     No results for input(s): WBC, HGB, HCT, PLT, MCV, MCH, MCHC, RDW, NRBC, SEGSPCT, BANDSPCT, BLASTSPCT, METASPCT, LYMPHOPCT, PROMYELOPCT, MONOPCT, MYELOPCT, EOSPCT, BASOPCT, MONOSABS, LYMPHSABS, EOSABS, BASOSABS, DIFFTYPE in the last 72 hours.     Invalid input(s): ATYLMREL  Recent Labs     12/31/20  0456      K 4.0      CO2 25   BUN 5*   CREATININE 0.5   GFRAA >60   LABGLOM >60   GLUCOSE 247*   PROT 8.0 LABALBU 3.6   CALCIUM 8.8   BILITOT 0.3   ALKPHOS 84   AST 19   ALT 13     U/A:    Lab Results   Component Value Date    COLORU Yellow 01/14/2016    PROTEINU Negative 01/14/2016    PHUR 6.0 01/14/2016    WBCUA 1-3 05/07/2013    RBCUA NONE 05/07/2013    BACTERIA RARE 05/07/2013    CLARITYU Clear 01/14/2016    SPECGRAV >=1.030 01/14/2016    LEUKOCYTESUR Negative 01/14/2016    UROBILINOGEN 0.2 01/14/2016    BILIRUBINUR Negative 01/14/2016    BLOODU Negative 01/14/2016    GLUCOSEU Negative 01/14/2016        MICRO  Blood cultures No results found for: BC    ASSESSMENT:    Active Hospital Problems    Diagnosis Date Noted    Sepsis due to COVID-19 ([facility] [U07.1, A41.89]     Acute hypoxemic respiratory failure due to COVID-19 ([facility]) [U07.1, J96.01] 12/30/2020    Class 3 severe obesity due to excess calories with body mass index (BMI) of 50.0 to 59.9 in adult ([facility] [E66.01, Z68.43] 12/30/2020       SARS-COV-2- positive with pneumonia on 6L  HYPERGLYCEMIA    · Actemra 1/1  · Convalescent plasma  x2  · Cont  proning, side to side positions  · Is   · Inc activity     Labs pending      vitamin D (ERGOCALCIFEROL) capsule 50,000 Units, Weekly    lactobacillus (CULTURELLE) capsule 1 capsule, Daily    remdesivir 100 mg in sodium chloride 0.9 % 250 mL IVPB, Q24H    enoxaparin (LOVENOX) injection 40 mg, BID    dexamethasone (DECADRON) tablet 6 mg, Daily    ascorbic acid (VITAMIN C) tablet 1,000 mg, Daily    vitamin B-6 (PYRIDOXINE) tablet 50 mg, Daily    thiamine mononitrate tablet 100 mg, Daily    zinc sulfate (ZINCATE) capsule 50 mg, Daily    Vitamin D (CHOLECALCIFEROL) tablet 1,000 Units, Daily    And    vitamin D3 (CHOLECALCIFEROL) tablet 5,000 Units, Daily    budesonide-formoterol (SYMBICORT) 160-4.5 MCG/ACT inhaler 2 puff, BID           Follow COVID-19/SARS-COV-2- [facility]    DVT prophylaxis/TREATMENT    PLAN: CONTINUE CURRENT MEDICATIONS AND SUPPORTIVE CARE.       Thank you for involving me in the care of Collette

## 2021-01-02 NOTE — PROGRESS NOTES
[address] [place], [place] [address]ist   Progress Note    Admitting Date and Time: 12/30/2020  7:58 AM  Admit Dx: Acute hypoxemic respiratory failure due to COVID-19 ([facility]) [U07.1, J96.01]    Subjective:    1/1: Pt feels better. States she did sleep on her belly overnight. Still seems a little anxious and apprehensive. 1/2: States feeling a lot better after that new medication yesterday(Actemra). She is asking about discharge tomorrow.     Per RN: patient continues to utilize IS     vitamin D  50,000 Units Oral Weekly    lactobacillus  1 capsule Oral Daily    lidocaine  5 mL Intradermal Once    heparin flush  1 mL Intravenous 2 times per day    remdesivir IVPB  100 mg Intravenous Q24H    influenza virus vaccine  0.5 mL Intramuscular Prior to discharge    atorvastatin  10 mg Oral Daily    enoxaparin  40 mg Subcutaneous BID    dexamethasone  6 mg Oral Daily    vitamin C  1,000 mg Oral Daily    vitamin B-6  50 mg Oral Daily    thiamine mononitrate  100 mg Oral Daily    zinc sulfate  50 mg Oral Daily    sodium chloride flush  10 mL Intravenous 2 times per day    Vitamin D  1,000 Units Oral Daily    And    vitamin D3  5,000 Units Oral Daily    budesonide-formoterol  2 puff Inhalation BID         sodium chloride, , PRN      sodium chloride flush, 10 mL, PRN      heparin flush, 1 mL, PRN      sodium chloride, 30 mL, PRN      guaiFENesin-dextromethorphan, 5 mL, Q4H PRN      sodium chloride flush, 10 mL, PRN      promethazine, 12.5 mg, Q6H PRN    Or      ondansetron, 4 mg, Q6H PRN      polyethylene glycol, 17 g, Daily PRN      acetaminophen, 650 mg, Q6H PRN    Or      acetaminophen, 650 mg, Q6H PRN      zolpidem, 5 mg, Nightly PRN         Objective:    BP (!) 144/87   Pulse 95   Temp 98.4 °F (36.9 °C) (Infrared)   Resp 20   Ht 5' 10\" (1.778 m)   Wt (!) 383 lb (173.7 kg)   SpO2 91%   BMI 54.95 kg/m²   General Appearance: alert and oriented to person, place and time with less conversational dyspnea. Skin: warm and dry  Head: normocephalic and atraumatic  Eyes: pupils equal, round, and reactive to light, extraocular eye movements intact, conjunctivae normal  Neck: neck supple and non tender without mass   Pulmonary/Chest: diminished but with few scattered wheezes.   Cardiovascular: normal rate, normal S1 and S2 and no carotid bruits  Abdomen: soft, obese, non-tender, non-distended, normal bowel sounds, no masses or organomegaly  Extremities: no cyanosis, no clubbing and no edema  Neurologic: no cranial nerve deficit and speech normal    Recent Labs     12/31/20 0456 01/02/21  0930    136   K 4.0 3.8    97*   CO2 25 27   BUN 5* 12   CREATININE 0.5 0.6   GLUCOSE 247* 338*   CALCIUM 8.8 9.0       Recent Labs     12/31/20 0456 01/02/21  0930   ALKPHOS 84 82   PROT 8.0 8.0   LABALBU 3.6 3.7   BILITOT 0.3 0.4   AST 19 18   ALT 13 13       No results for input(s): WBC, RBC, HGB, HCT, MCV, MCH, MCHC, RDW, PLT, MPV in the last 72 hours.      Vitamin D 25 Hydroxy [[id number]] (Abnormal) Collected: 12/31/20 0456     Specimen: Blood Updated: 12/31/20 0822      Vit D, 25-Hydroxy 11 ng/mL      Narrative:       Collection has been rescheduled by KERRY at 12/31/2020 05:17 Reason:   Done                 Brain Natriuretic Peptide [[id number]] Collected: 12/30/20 0844     Specimen: Blood Updated: 12/30/20 0948       Pro-BNP 9 pg/mL       COVID-19 [[id number]] (Abnormal) Collected: 12/30/20 0844       Updated: 12/30/20 0921       SARS-CoV-2, NAAT DETECTED         SARS-COV-2 biomarkers    Recent Labs     12/31/20 0456 01/01/21  0600 01/02/21  0930   CRP 16.7* 8.3* 3.7*   PROCAL 0.16* 0.12*  --    FERRITIN 1,060 1,221 1,033   * 448* 402*   TROPONINI <0.01 <0.01 <0.01   DDIMER 695 323 261   FIBRINOGEN >700* >700* 677*   INR 1.2 1.1 1.1   PROTIME 13.2* 12.9* 13.0*   AST 19  --  18   ALT 13  --  13     Lab Results   Component Value Date    CHOL 268 10/22/2015    TRIG 206 10/22/2015    HDL 53 10/22/2015    LDLCALC 174 10/22/2015    LABVLDL 41 10/22/2015     Lab Results   Component Value Date/Time    VITD25 11 (L) 12/31/2020 04:56 AM       Strep Pneumoniae Antigen [[id number]] Collected: 12/30/20 2242     Specimen: Urine, clean catch Updated: 12/31/20 1050      STREP PNEUMONIAE ANTIGEN, URINE --      Presumptive negative- suggests no current or recent   pneumococcal infection. Infection due to Strep pneumoniae cannot be   ruled out since the antigen present in the sample   may be below the detection limit of the test.   Normal Range:Presumptive Negative      Narrative:       Source: URINE       Site: Urine&Urine               Legionella antigen, urine [[id number]] Collected: 12/30/20 2242     Specimen: Urine, clean catch Updated: 12/31/20 1048      L. pneumophila Serogp 1 Ur Ag --      Presumptive Negative -suggesting no recent or current infections   with Legionella pneumophila serogroup 1. Infection to Legionella cannot be ruled out since other serogroups   and species may cause infection, antigen may not be present in   early infection, or level of antigen may be below the   detection limit. Normal Range: Presumptive Negative      Narrative:       Source: URINE       Site: Urine&Urine         Radiology:   XR CHEST PORTABLE   Final Result   Bilateral airspace opacity may represent multifocal pneumonia or pulmonary   edema in the proper clinical setting. Assessment:  Principal Problem:    Acute hypoxemic respiratory failure due to COVID-19 [facility])  Active Problems:    Class 3 severe obesity due to excess calories with body mass index (BMI) of 50.0 to 59.9 in adult [facility])    Sepsis due to COVID-19 [facility])  Resolved Problems:    * No resolved hospital problems. *      Plan:    1. Acute hypoxic respiratory failure due to COVID-19--continue treatment with Decadron day #4/10  and remdesivir day #4/5. Urinary antigens for strep and Legionella negative.  Placed on vitamin regimen of thiamine, B6, C, D

## 2021-01-03 VITALS
WEIGHT: 293 LBS | BODY MASS INDEX: 41.95 KG/M2 | SYSTOLIC BLOOD PRESSURE: 160 MMHG | HEART RATE: 82 BPM | HEIGHT: 70 IN | TEMPERATURE: 98.4 F | OXYGEN SATURATION: 95 % | DIASTOLIC BLOOD PRESSURE: 100 MMHG | RESPIRATION RATE: 18 BRPM

## 2021-01-03 LAB
ALBUMIN SERPL-MCNC: 3.7 G/DL (ref 3.5–5.2)
ALP BLD-CCNC: 75 U/L (ref 35–104)
ALT SERPL-CCNC: 12 U/L (ref 0–32)
ANION GAP SERPL CALCULATED.3IONS-SCNC: 13 MMOL/L (ref 7–16)
AST SERPL-CCNC: 13 U/L (ref 0–31)
BASOPHILS ABSOLUTE: 0 E9/L (ref 0–0.2)
BASOPHILS RELATIVE PERCENT: 0.4 % (ref 0–2)
BILIRUB SERPL-MCNC: 0.4 MG/DL (ref 0–1.2)
BUN BLDV-MCNC: 13 MG/DL (ref 6–20)
C-REACTIVE PROTEIN: 2 MG/DL (ref 0–0.4)
CALCIUM SERPL-MCNC: 8.9 MG/DL (ref 8.6–10.2)
CHLORIDE BLD-SCNC: 99 MMOL/L (ref 98–107)
CO2: 26 MMOL/L (ref 22–29)
CREAT SERPL-MCNC: 0.5 MG/DL (ref 0.5–1)
D DIMER: 272 NG/ML DDU
EOSINOPHILS ABSOLUTE: 0 E9/L (ref 0.05–0.5)
EOSINOPHILS RELATIVE PERCENT: 0 % (ref 0–6)
FERRITIN: 918 NG/ML
FIBRINOGEN: 549 MG/DL (ref 225–540)
GFR AFRICAN AMERICAN: >60
GFR NON-AFRICAN AMERICAN: >60 ML/MIN/1.73
GLUCOSE BLD-MCNC: 317 MG/DL (ref 74–99)
HBA1C MFR BLD: 8.5 % (ref 4–5.6)
HCT VFR BLD CALC: 37 % (ref 34–48)
HEMOGLOBIN: 11.7 G/DL (ref 11.5–15.5)
INR BLD: 1.1
LACTATE DEHYDROGENASE: 353 U/L (ref 135–214)
LYMPHOCYTES ABSOLUTE: 1.83 E9/L (ref 1.5–4)
LYMPHOCYTES RELATIVE PERCENT: 21.7 % (ref 20–42)
MCH RBC QN AUTO: 27.9 PG (ref 26–35)
MCHC RBC AUTO-ENTMCNC: 31.6 % (ref 32–34.5)
MCV RBC AUTO: 88.3 FL (ref 80–99.9)
MONOCYTES ABSOLUTE: 0.5 E9/L (ref 0.1–0.95)
MONOCYTES RELATIVE PERCENT: 6.1 % (ref 2–12)
MYELOCYTE PERCENT: 0.9 % (ref 0–0)
NEUTROPHILS ABSOLUTE: 5.98 E9/L (ref 1.8–7.3)
NEUTROPHILS RELATIVE PERCENT: 71.3 % (ref 43–80)
NUCLEATED RED BLOOD CELLS: 0.9 /100 WBC
PDW BLD-RTO: 13.5 FL (ref 11.5–15)
PLATELET # BLD: 382 E9/L (ref 130–450)
PMV BLD AUTO: 10.3 FL (ref 7–12)
POLYCHROMASIA: ABNORMAL
POTASSIUM SERPL-SCNC: 3.7 MMOL/L (ref 3.5–5)
PROCALCITONIN: 0.06 NG/ML (ref 0–0.08)
PROTHROMBIN TIME: 12.8 SEC (ref 9.3–12.4)
RBC # BLD: 4.19 E12/L (ref 3.5–5.5)
SODIUM BLD-SCNC: 138 MMOL/L (ref 132–146)
TOTAL PROTEIN: 7.7 G/DL (ref 6.4–8.3)
TROPONIN: <0.01 NG/ML (ref 0–0.03)
WBC # BLD: 8.3 E9/L (ref 4.5–11.5)

## 2021-01-03 PROCEDURE — 84484 ASSAY OF TROPONIN QUANT: CPT

## 2021-01-03 PROCEDURE — 2700000000 HC OXYGEN THERAPY PER DAY

## 2021-01-03 PROCEDURE — 85610 PROTHROMBIN TIME: CPT

## 2021-01-03 PROCEDURE — 99239 HOSP IP/OBS DSCHRG MGMT >30: CPT | Performed by: INTERNAL MEDICINE

## 2021-01-03 PROCEDURE — 82728 ASSAY OF FERRITIN: CPT

## 2021-01-03 PROCEDURE — 2500000003 HC RX 250 WO HCPCS: Performed by: INTERNAL MEDICINE

## 2021-01-03 PROCEDURE — 6360000002 HC RX W HCPCS: Performed by: INTERNAL MEDICINE

## 2021-01-03 PROCEDURE — 83036 HEMOGLOBIN GLYCOSYLATED A1C: CPT

## 2021-01-03 PROCEDURE — 85025 COMPLETE CBC W/AUTO DIFF WBC: CPT

## 2021-01-03 PROCEDURE — 2580000003 HC RX 258: Performed by: INTERNAL MEDICINE

## 2021-01-03 PROCEDURE — 36415 COLL VENOUS BLD VENIPUNCTURE: CPT

## 2021-01-03 PROCEDURE — 90686 IIV4 VACC NO PRSV 0.5 ML IM: CPT | Performed by: INTERNAL MEDICINE

## 2021-01-03 PROCEDURE — 85378 FIBRIN DEGRADE SEMIQUANT: CPT

## 2021-01-03 PROCEDURE — 80053 COMPREHEN METABOLIC PANEL: CPT

## 2021-01-03 PROCEDURE — 85384 FIBRINOGEN ACTIVITY: CPT

## 2021-01-03 PROCEDURE — 84145 PROCALCITONIN (PCT): CPT

## 2021-01-03 PROCEDURE — 83615 LACTATE (LD) (LDH) ENZYME: CPT

## 2021-01-03 PROCEDURE — 6370000000 HC RX 637 (ALT 250 FOR IP): Performed by: SPECIALIST

## 2021-01-03 PROCEDURE — 6370000000 HC RX 637 (ALT 250 FOR IP): Performed by: INTERNAL MEDICINE

## 2021-01-03 PROCEDURE — 86140 C-REACTIVE PROTEIN: CPT

## 2021-01-03 PROCEDURE — G0008 ADMIN INFLUENZA VIRUS VAC: HCPCS | Performed by: INTERNAL MEDICINE

## 2021-01-03 RX ORDER — ERGOCALCIFEROL 1.25 MG/1
50000 CAPSULE ORAL WEEKLY
Qty: 5 CAPSULE | Refills: 0 | Status: SHIPPED | OUTPATIENT
Start: 2021-01-08 | End: 2022-01-03 | Stop reason: ALTCHOICE

## 2021-01-03 RX ORDER — BUDESONIDE AND FORMOTEROL FUMARATE DIHYDRATE 160; 4.5 UG/1; UG/1
2 AEROSOL RESPIRATORY (INHALATION) 2 TIMES DAILY
Qty: 1 INHALER | Refills: 0
Start: 2021-01-03 | End: 2021-02-06

## 2021-01-03 RX ORDER — PYRIDOXINE HCL (VITAMIN B6) 50 MG
50 TABLET ORAL DAILY
Qty: 30 TABLET | Refills: 0 | Status: SHIPPED | OUTPATIENT
Start: 2021-01-04 | End: 2021-02-06

## 2021-01-03 RX ORDER — ZINC SULFATE 50(220)MG
50 CAPSULE ORAL DAILY
Qty: 30 CAPSULE | Refills: 0 | Status: SHIPPED | OUTPATIENT
Start: 2021-01-04 | End: 2022-01-03 | Stop reason: ALTCHOICE

## 2021-01-03 RX ORDER — DEXAMETHASONE 6 MG/1
6 TABLET ORAL DAILY
Qty: 5 TABLET | Refills: 0 | Status: SHIPPED | OUTPATIENT
Start: 2021-01-04 | End: 2021-01-09

## 2021-01-03 RX ORDER — LACTOBACILLUS RHAMNOSUS GG 10B CELL
1 CAPSULE ORAL DAILY
Qty: 60 CAPSULE | Refills: 0 | Status: SHIPPED | OUTPATIENT
Start: 2021-01-04 | End: 2022-01-03 | Stop reason: ALTCHOICE

## 2021-01-03 RX ORDER — GAUZE BANDAGE 2" X 2"
100 BANDAGE TOPICAL DAILY
Qty: 30 TABLET | Refills: 0 | Status: SHIPPED | OUTPATIENT
Start: 2021-01-04 | End: 2022-01-03 | Stop reason: ALTCHOICE

## 2021-01-03 RX ADMIN — Medication 1 CAPSULE: at 08:38

## 2021-01-03 RX ADMIN — ATORVASTATIN CALCIUM 10 MG: 10 TABLET, FILM COATED ORAL at 08:39

## 2021-01-03 RX ADMIN — CHOLECALCIFEROL TAB 125 MCG (5000 UNIT) 1000 UNITS: 125 TAB at 08:41

## 2021-01-03 RX ADMIN — Medication 50 MG: at 08:39

## 2021-01-03 RX ADMIN — OXYCODONE HYDROCHLORIDE AND ACETAMINOPHEN 1000 MG: 500 TABLET ORAL at 08:39

## 2021-01-03 RX ADMIN — Medication 10 ML: at 08:40

## 2021-01-03 RX ADMIN — ENOXAPARIN SODIUM 40 MG: 40 INJECTION SUBCUTANEOUS at 08:37

## 2021-01-03 RX ADMIN — INFLUENZA A VIRUS A/VICTORIA/2454/2019 IVR-207 (H1N1) ANTIGEN (PROPIOLACTONE INACTIVATED), INFLUENZA A VIRUS A/HONG KONG/2671/2019 IVR-208 (H3N2) ANTIGEN (PROPIOLACTONE INACTIVATED), INFLUENZA B VIRUS B/VICTORIA/705/2018 BVR-11 ANTIGEN (PROPIOLACTONE INACTIVATED), INFLUENZA B VIRUS B/PHUKET/3073/2013 BVR-1B ANTIGEN (PROPIOLACTONE INACTIVATED) 0.5 ML: 15; 15; 15; 15 INJECTION, SUSPENSION INTRAMUSCULAR at 17:22

## 2021-01-03 RX ADMIN — Medication 100 UNITS: at 08:40

## 2021-01-03 RX ADMIN — REMDESIVIR 100 MG: 100 INJECTION, POWDER, LYOPHILIZED, FOR SOLUTION INTRAVENOUS at 11:34

## 2021-01-03 RX ADMIN — THIAMINE HCL TAB 100 MG 100 MG: 100 TAB at 08:38

## 2021-01-03 RX ADMIN — PYRIDOXINE HCL TAB 50 MG 50 MG: 50 TAB at 08:39

## 2021-01-03 RX ADMIN — DEXAMETHASONE 6 MG: 6 TABLET ORAL at 08:38

## 2021-01-03 RX ADMIN — CHOLECALCIFEROL TAB 125 MCG (5000 UNIT) 5000 UNITS: 125 TAB at 08:38

## 2021-01-03 NOTE — PROGRESS NOTES
Patient observed using IS at regular intervals while awake. Patient rotated prone and side to side while in bed. Tolerated well.  No complaints of distress

## 2021-01-03 NOTE — PROGRESS NOTES
[facility] PROGRESS NOTE    F/u SARS-COV-2 infection FACE TO FACE    SUBJECTIVE:  Willem Cloud, 36 y.o., female   covid +   Pt was discussed with care team  Afebrile  SUPINE PER PT HAS BEEN PRONING   3L ambulated maintained sat >90%  FEELS BETTER  No questions  ROS:GENERAL-             GENERAL:Temperature:  Current - Temp: 97.1 °F (36.2 °C);  Max - Temp  Av.4 °F (36.3 °C)  Min: 97 °F (36.1 °C)  Max: 98.4 °F (36.9 °C)  Respiratory Rate : Resp  Av  Min: 18  Max: 20  Pulse Range: Pulse  Av.5  Min: 83  Max: 95  Blood Pressure Range:  Systolic (45ZLV), IQ , Min:132 , GMR:283   ; Diastolic (67WTE), PNA:85, Min:74, Max:87    Pulse ox Range: SpO2  Av.3 %  Min: 91 %  Max: 95 %  24hr I & O:      Intake/Output Summary (Last 24 hours) at 1/3/2021 1329  Last data filed at 1/3/2021 9804  Gross per 24 hour   Intake 1020 ml   Output --   Net 1020 ml       CONSTITUTIONAL:   Awake, alert INC BMI  HEENT:    AT/NC glasses  LUNGS:   CTAB ANT on 3L  CARDIOVASCULAR:   S1 and S2   ABDOMEN:     Normal bowel sounds, non-distended, non-tender   EXTREMITIES:   FROM    SKIN:     NO RASH   CNS:    NAD  PSYCH:   Pleasant     MEDS:      vitamin D (ERGOCALCIFEROL) capsule 50,000 Units, Weekly      lactobacillus (CULTURELLE) capsule 1 capsule, Daily      0.9 % sodium chloride infusion, PRN      lidocaine 1 % injection 5 mL, Once      sodium chloride flush 0.9 % injection 10 mL, PRN      heparin flush 100 UNIT/ML injection 100 Units, 2 times per day      heparin flush 100 UNIT/ML injection 100 Units, PRN      0.9 % sodium chloride bolus, PRN      influenza quadrivalent split vaccine (FLUZONE;FLUARIX;FLULAVAL;AFLURIA) injection 0.5 mL, Prior to discharge      atorvastatin (LIPITOR) tablet 10 mg, Daily      enoxaparin (LOVENOX) injection 40 mg, BID      guaiFENesin-dextromethorphan (ROBITUSSIN DM) 100-10 MG/5ML syrup 5 mL, Q4H PRN      dexamethasone (DECADRON) tablet 6 mg, Daily      ascorbic acid (VITAMIN C) tablet 1,000 mg, Daily      vitamin B-6 (PYRIDOXINE) tablet 50 mg, Daily      thiamine mononitrate tablet 100 mg, Daily      zinc sulfate (ZINCATE) capsule 50 mg, Daily      sodium chloride flush 0.9 % injection 10 mL, 2 times per day      sodium chloride flush 0.9 % injection 10 mL, PRN      promethazine (PHENERGAN) tablet 12.5 mg, Q6H PRN    Or      ondansetron (ZOFRAN) injection 4 mg, Q6H PRN      polyethylene glycol (GLYCOLAX) packet 17 g, Daily PRN      acetaminophen (TYLENOL) tablet 650 mg, Q6H PRN    Or      acetaminophen (TYLENOL) suppository 650 mg, Q6H PRN      Vitamin D (CHOLECALCIFEROL) tablet 1,000 Units, Daily    And      vitamin D3 (CHOLECALCIFEROL) tablet 5,000 Units, Daily      zolpidem (AMBIEN) tablet 5 mg, Nightly PRN      budesonide-formoterol (SYMBICORT) 160-4.5 MCG/ACT inhaler 2 puff, BID          Data:  Lab Results   Component Value Date    COVID19 DETECTED 12/30/2020     COVID-19/SARS-COV-2 LABS  Recent Labs     01/01/21  0600 01/02/21  0930 01/03/21  0615   CRP 8.3* 3.7* 2.0*   PROCAL 0.12* 0.09* 0.06   FERRITIN 1,221 1,033 918   * 402* 353*   TROPONINI <0.01 <0.01 <0.01   DDIMER 323 261 272   FIBRINOGEN >700* 677* 549*   INR 1.1 1.1 1.1   PROTIME 12.9* 13.0* 12.8*   AST  --  18 13   ALT  --  13 12     Lab Results   Component Value Date    CHOL 268 10/22/2015    TRIG 206 10/22/2015    HDL 53 10/22/2015    LDLCALC 174 10/22/2015    LABVLDL 41 10/22/2015     Lab Results   Component Value Date/Time    VITD25 11 (L) 12/31/2020 04:56 AM     Recent Labs     01/03/21  0615   WBC 8.3   HGB 11.7   HCT 37.0      MCV 88.3   MCH 27.9   MCHC 31.6*   RDW 13.5   NRBC 0.9   LYMPHOPCT 21.7   MONOPCT 6.1   MYELOPCT 0.9   BASOPCT 0.4   MONOSABS 0.50   LYMPHSABS 1.83   EOSABS 0.00*   BASOSABS 0.00     Recent Labs     01/02/21  0930 01/03/21  0615    138   K 3.8 3.7   CL 97* 99   CO2 27 26   BUN 12 13   CREATININE 0.6 0. 5   GFRAA >60 >60   LABGLOM >60 >60   GLUCOSE 338* 317*   PROT 8.0 7.7   LABALBU 3.7 3.7   CALCIUM 9.0 8.9   BILITOT 0.4 0.4   ALKPHOS 82 75   AST 18 13   ALT 13 12     U/A:    Lab Results   Component Value Date    COLORU Yellow 01/14/2016    PROTEINU Negative 01/14/2016    PHUR 6.0 01/14/2016    WBCUA 1-3 05/07/2013    RBCUA NONE 05/07/2013    BACTERIA RARE 05/07/2013    CLARITYU Clear 01/14/2016    SPECGRAV >=1.030 01/14/2016    LEUKOCYTESUR Negative 01/14/2016    UROBILINOGEN 0.2 01/14/2016    BILIRUBINUR Negative 01/14/2016    BLOODU Negative 01/14/2016    GLUCOSEU Negative 01/14/2016        MICRO  Blood cultures No results found for: BC    ASSESSMENT:    Active Hospital Problems    Diagnosis Date Noted    Sepsis due to COVID-19 ([facility] [U07.1, A41.89]     Acute hypoxemic respiratory failure due to COVID-19 ([facility] [U07.1, J96.01] 12/30/2020    Class 3 severe obesity due to excess calories with body mass index (BMI) of 50.0 to 59.9 in adult ([facility] [E66.01, Z68.43] 12/30/2020       SARS-COV-2- positive with pneumonia on 6L  HYPERGLYCEMIA    · Actemra 1/1  · Convalescent plasma  x2  · Cont  proning, side to side positions  · Is   · Inc activity     Labs reviewed\      vitamin D (ERGOCALCIFEROL) capsule 50,000 Units, Weekly    lactobacillus (CULTURELLE) capsule 1 capsule, Daily    remdesivir 100 mg in sodium chloride 0.9 % 250 mL IVPB, Q24H last day     enoxaparin (LOVENOX) injection 40 mg, BID    dexamethasone (DECADRON) tablet 6 mg, Daily    ascorbic acid (VITAMIN C) tablet 1,000 mg, Daily    vitamin B-6 (PYRIDOXINE) tablet 50 mg, Daily    thiamine mononitrate tablet 100 mg, Daily    zinc sulfate (ZINCATE) capsule 50 mg, Daily    Vitamin D (CHOLECALCIFEROL) tablet 1,000 Units, Daily    And    vitamin D3 (CHOLECALCIFEROL) tablet 5,000 Units, Daily    budesonide-formoterol (SYMBICORT) 160-4.5 MCG/ACT inhaler 2 puff, BID    For d/c today with O2   f/u prn    Follow COVID-19/SARS-COV-2- [facility]    DVT prophylaxis/TREATMENT    PLAN: CONTINUE CURRENT MEDICATIONS AND SUPPORTIVE CARE. Thank you for involving me in the care of Julio Pacheco. Please do not hesitate to call for any questions or concerns.     Electronically signed by Apolonia Espino MD on 1/3/2021 at 1:29 PM    Phone [phone]  Fax [phone]

## 2021-01-03 NOTE — CARE COORDINATION
1/3/2021 1151 CM note: Noted order for home o2 needs. Spoke with pt by phone regarding home o2, pt agreeable. Only in network provider is [employer] ([phone]). Referral placed to [facility],clinical information, demos and Rx faxed to [phone]. Await return call from [address] regarding o2 delivery time.  Laxmi WRIGHT

## 2021-01-03 NOTE — CARE COORDINATION
Patient qualifies for home oxygen as per home O2 study done by nursing today. See below:    Pulse ox was 94% at rest on 3 liters oxygen  Ambulated pt on room air  Pulse ox was 84% on room air while ambulating  Heart rate was 127  4 liters oxygen via nasal cannula applied  Recovery pulse ox was 92% on 4 liters while ambulating after less than 1 minute.    Heart rate was 109  Pt back to bed  Recovery pulse on 3 liters at rest was 93%    Electronically signed by Claudio Vyas MD on 1/3/2021 at 10:24 AM

## 2021-01-03 NOTE — PROGRESS NOTES
Pulse ox was 94% at rest on 3 liters oxygen  Ambulated pt on room air  Pulse ox was 84% on room air while ambulating  Heart rate was 127  4 liters oxygen via nasal cannula applied  Recovery pulse ox was 92% on 4 liters while ambulating after less than 1 minute.    Heart rate was 109  Pt back to bed  Recovery pulse on 3 liters at rest was 93%

## 2021-01-03 NOTE — DISCHARGE SUMMARY
[facility] Physician Discharge Summary       Colletta Rouleau [place], [place] Sauer [address] [facility] [address]  [phone]    Schedule an appointment as soon as possible for a visit in 1 week  Hospital follow up      Activity level: As tolerated    Diet: DIET GENERAL;    Labs:None    Condition at discharge: Stable    Dispo:Home    Continue supplemental oxygen via nasal canula @ 4 LPM round-the-clock. Patient ID:  Diana Graham  [id number]  36 y.o.  1980    Admit date: 12/30/2020    Discharge date and time:  1/3/2021  4:44 PM    Admission Diagnoses: Principal Problem:    [facility]  Active Problems:    Class 3 severe obesity due to excess calories with body mass index (BMI) of 50.0 to 59.9 in [facility])    Sepsis due to [address])  Resolved Problems:    * No resolved hospital problems. *      Discharge Diagnoses: Principal Problem:    Acute hypoxemic respiratory failure [facility])  Active Problems:    Class 3 severe obesity due to excess calories with body mass index (BMI) of 50.0 to 59.9 in [facility])    [facility] to [facility]  Resolved Problems:    * No resolved hospital problems. *      Consults:  IP CONSULT TO INFECTIOUS DISEASES    Procedures: None    History of Present Illness:  36 y.o. female with a history of morbid obesity but no other known medical problems who presents with symptoms concerning for Covid. Her boyfriend was diagnosed with Covid week ago Monday and she is put herself in quarantine since then. Last Wednesday, patient started having symptoms of fevers and chills and just felt fatigued. She pretty much stayed home and was in bed but the night prior to admission she started getting shortness of breath and having a lot of coughing and retching. She did have some associated diarrhea. She came in today because the shortness of breath was much worse today.               In the ER, patient tested positive for Covid. Chest x-ray showed bilateral airspace opacities consistent with multifocal pneumonia or pulmonary edema. Initial pulse ox was only 74% on room air but with 4 L she got up to 93%. Patient was given Decadron and per my request was started on remdesivir. She was admitted to the Covid floor for further treatment evaluation.       Hospital Course: 36 female admitted as per above details. See outline below for additional details and issues addressed this admission:     1. Acute hypoxic respiratory failure due to COVID-19--continue treatment with Decadron day #5/10  and remdesivir day #5/5 completed today. Home on Decadron 6 mg daily for 5 additional days. Urinary antigens for strep and Legionella negative. Placed on vitamin regimen of thiamine, B6, C, D and zinc (E-scribed Rx for these but may buy OTC).   Lovenox 40 mg twice daily given BMI greater than 30. Discussed convalescent plasma and she was agreeable. Received 2 units CP 12/31. Consulted ID for consideration of tocilizumab and patient did receive 1/1 . CRP trending down. During hospital stay [facility] labs were followed. Symbicort ordered Thursday  but did not receive until Friday morning. Home to finish sample inhaler (2 weeks of treatment). She qualified for home oxygen of 4L and that was arranged on day of discharge. 2. Vitamin D deficiency--on admission started on D3 6000 units daily. However, level came back at 11 so will place on Vitamin D 2 50,000 IU weekly as well to replenish vitamin D stores. First dose given1/1. Her high BMI necessitates higher dosing. Home on D3 5,000 IU daily and D2 50,000 IU weekly x 5. E-scribed Rx to pharmacy. 3. Morbid obesity with BMI of 54.95--diet and lifestyle modifications counseled.   4. New diagnosis of type II DM--noticed blood sugars were running high. A1c was check and it came back elevated at 8.5%. This is c/w diabetes. She states no prior history but two sisters have it as well.  Looking back at chart and had A1c 8.2% in 2015 and 2016. She was told she needs   5. Disposition--okay to discharge home today with oxygen. Discharge Exam:  Vitals:    01/03/21 0830 01/03/21 0840 01/03/21 0900 01/03/21 1515   BP: 132/79   (!) 160/100   Pulse: 94   82   Resp: 20   18   Temp: 97.1 °F (36.2 °C)   98.4 °F (36.9 °C)   TempSrc: Infrared   Infrared   SpO2: 94% 93% 92% 95%   Weight:       Height:         General Appearance: alert and oriented to person, place and time with less conversational dyspnea. Skin: warm and dry  Head: normocephalic and atraumatic  Eyes: pupils equal, round, and reactive to light, extraocular eye movements intact, conjunctivae normal  Neck: neck supple and non tender without mass   Pulmonary/Chest: diminished but with few scattered wheezes. Cardiovascular: normal rate, normal S1 and S2 and no carotid bruits  Abdomen: soft, obese, non-tender, non-distended, normal bowel sounds, no masses or organomegaly  Extremities: no cyanosis, no clubbing and no edema  Neurologic: no cranial nerve deficit and speech normal  I/O last 3 completed shifts: In: 540 [P.O.:540]  Out: -   No intake/output data recorded. LABS:  Recent Labs     01/02/21  0930 01/03/21  0615    138   K 3.8 3.7   CL 97* 99   CO2 27 26   BUN 12 13   CREATININE 0.6 0.5   GLUCOSE 338* 317*   CALCIUM 9.0 8.9       Recent Labs     01/03/21  0615   WBC 8.3   RBC 4.19   HGB 11.7   HCT 37.0   MCV 88.3   MCH 27.9   MCHC 31.6*   RDW 13.5      MPV 10.3       Strep Pneumoniae Antigen [[id number]] Collected: 12/30/20 2242     Specimen: Urine, clean catch Updated: 12/31/20 1050       STREP PNEUMONIAE ANTIGEN, URINE --       Presumptive negative- suggests no current or recent   pneumococcal infection.    Infection due to Strep pneumoniae cannot be   ruled out since the antigen present in the sample   may be below the detection limit of the test.   Normal Range:Presumptive Negative      Narrative:       Source: URINE       ONE XRAY VIEW OF THE CHEST 12/30/2020 8:55 am COMPARISON: Chest radiograph March 28, 2015 HISTORY: ORDERING SYSTEM PROVIDED HISTORY: sob TECHNOLOGIST PROVIDED HISTORY: Reason for exam:->sob FINDINGS: Trachea is midline. Cardiomediastinal silhouette is stable in size. There are bilateral airspace opacities scattered throughout the lungs. No pneumothorax. No sizable pleural effusions    Bilateral airspace opacity may represent multifocal pneumonia or pulmonary edema in the proper clinical setting.         Patient Instructions:   Current Discharge Medication List      START taking these medications    Details   budesonide-formoterol (SYMBICORT) 160-4.5 MCG/ACT AERO Inhale 2 puffs into the lungs 2 times daily  Qty: 1 Inhaler, Refills: 0      dexamethasone (DECADRON) 6 MG tablet Take 1 tablet by mouth daily for 5 days  Qty: 5 tablet, Refills: 0      lactobacillus (CULTURELLE) CAPS capsule Take 1 capsule by mouth daily  Qty: 60 capsule, Refills: 0      thiamine mononitrate 100 MG tablet Take 1 tablet by mouth daily  Qty: 30 tablet, Refills: 0      vitamin B-6 (B-6) 50 MG tablet Take 1 tablet by mouth daily  Qty: 30 tablet, Refills: 0      vitamin D3 (CHOLECALCIFEROL) 125 MCG (5000 UT) TABS tablet Take 1 tablet by mouth daily  Qty: 30 tablet, Refills: 0      vitamin D (ERGOCALCIFEROL) 1.25 MG (01724 UT) CAPS capsule Take 1 capsule by mouth once a week  Qty: 5 capsule, Refills: 0      zinc sulfate (ZINCATE) 220 (50 Zn) MG capsule Take 1 capsule by mouth daily  Qty: 30 capsule, Refills: 0         CONTINUE these medications which have CHANGED    Details   ascorbic acid (VITAMIN C) 1000 MG tablet Take 1 tablet by mouth daily New higher dose  Qty: 30 tablet, Refills: 0         CONTINUE these medications which have NOT CHANGED    Details   atorvastatin (LIPITOR) 10 MG tablet Take 10 mg by mouth daily      hydroCHLOROthiazide (MICROZIDE) 12.5 MG capsule Take 12.5 mg by mouth three times a week Monday, Wednesday, Friday

## 2021-01-03 NOTE — CARE COORDINATION
1/3/2021 1214 CM note: Per [id number] [employer][phone]), it will be at least 4 hours until portable o2 can be delivered to hospital. Patient and Di Pee, unit secretary, vignesh Hair RN

## 2021-01-04 ENCOUNTER — CARE COORDINATION (OUTPATIENT)
Dept: CASE MANAGEMENT | Age: 41
End: 2021-01-04

## 2021-01-04 LAB
BLOOD BANK DISPENSE STATUS: NORMAL
BLOOD BANK PRODUCT CODE: NORMAL
BPU ID: NORMAL
DESCRIPTION BLOOD BANK: NORMAL

## 2021-01-04 NOTE — CARE COORDINATION
[facility] Transitions Initial Follow Up Call    Call within 2 business days of discharge: Yes    Patient: Anatsacio Aden Patient : 1980   MRN: [id number]  Reason for Admission: Acute hypoxemic respiratory failure d/t COVID-19  Discharge Date: 1/3/21 RARS: Readmission Risk Score: 12      Last Discharge [facility]       Complaint Diagnosis Description Type Department Provider    20 Cough; Shortness of Breath; Fever; Emesis Acute respiratory failure with hypoxia (HCC) . .. ED to Hosp-Admission (Discharged) (ADMITTED) Janet Scott MD; Brandon Salinas. .. Challenges to be reviewed by the provider   Additional needs identified to be addressed with provider No  none    Discussed COVID-19 related testing which was available at this time. Test results were positive. Patient informed of results, if available? Yes         Method of communication with provider : none    Advance Care Planning:   Does patient have an Advance Directive:  decision maker updated. Was this a readmission? No  Patient stated reason for admission: fever, chills and tiredness  Patients top risk factors for readmission: level of motivation and medical condition    Care Transition Nurse (CTN) contacted the patient by telephone to perform post hospital discharge assessment. Verified name and  with patient as identifiers. Provided introduction to self, and explanation of the CTN role. CTN reviewed discharge instructions, medical action plan and red flags with patient who verbalized understanding. Patient given an opportunity to ask questions and does not have any further questions or concerns at this time. Were discharge instructions available to patient? Yes. Reviewed appropriate site of care based on symptoms and resources available to patient including: PCP, Urgent care clinics, When to call 911 and Condition related references.  The patient agrees to contact the PCP office for questions related to their healthcare. Medication reconciliation was performed with patient, who verbalizes understanding of administration of home medications. Advised obtaining a 90-day supply of all daily and as-needed medications. Covid Risk Education    Patient has following risk factors of: sepsis and acute respiratory failure. Education provided regarding infection prevention, and signs and symptoms of COVID-19 and when to seek medical attention with patient who verbalized understanding. Discussed exposure protocols and quarantine From CDC: Are you at higher risk for severe illness?   and given an opportunity for questions and concerns. The patient agrees to contact the COVID-19 hotline [phone] or PCP office for questions related to COVID-19. For more information on steps you can take to protect yourself, see CDC's How to Protect Yourself     Patient/family/caregiver given information for [employer] and agrees to enroll yes  Patient's preferred e-mail: [email or web address] [email or web address]  Patient's preferred phone number: [phone]    Discussed follow-up appointments. If no appointment was previously scheduled, appointment scheduling offered: Yes. Is follow up appointment scheduled within 7 days of discharge? No  [facility] follow up appointment(s): CTN confirmed with patient she will follow up with Dr. Toya Lael (PCP) and will call today for appt. Declines CTN assistance. Non-face-to-face services provided:  Scheduled appointment with PCP-CTN confirmed with patient she will call PCP today to schedule follow up appt.    Obtained and reviewed discharge summary and/or continuity of care documents  Education of patient/family/caregiver/guardian to support self-management-CTN discussed COVID-19 precautions and knowing when to seek medical attention  Assessment and support for treatment adherence and medication management-CTN advised patient to take Decadron as directd until completely finished,     Care

## 2021-01-07 NOTE — PROGRESS NOTES
Physician Progress Note      Katherine Navarro  [facility] #:                  [id number]  :                       1980  ADMIT DATE:       2020 7:58 AM  [address] [place] DATE:        1/3/2021 5:33 PM  RESPONDING  PROVIDER #:        Rochelle Huang MD          QUERY TEXT:    Pt admitted with COVID-19 infection. Pt noted to have Sepsis documented in PN   beginning  and d/c summary.  If possible, please document in progress notes   and discharge summary the present on admission of ***:    The medical record reflects the following:  Risk Factors: COVID-19, Resp failure , morbid obesity,  pneumonia  Clinical Indicators: Wbc 9.0, Crp 16.7, Fibrinogen >700, procalc 0.16 Cxr   multifocal pneumonia vs pulmonary edema,   tachycardia, tachypnia  Treatment: NS 1L bolus in ER, Remdesivir, Acterma, Decadron, convalescent   plasma, vitamins,    Thank you, Latisha Hodgkins RN [facility] [phone]  Options provided:  -- Yes, Sepsis due to COVID-19 was present at the time of the order to admit   to the hospital  -- No, Sepsis due to COVID-19 was not present on admission and developed   during the inpatient stay  -- Other - I will add my own diagnosis  -- Disagree - Not applicable / Not valid  -- Disagree - Clinically unable to determine / Unknown  -- Refer to Clinical Documentation Reviewer    PROVIDER RESPONSE TEXT:    Yes, Sepsis due to COVID-19 was present at the time of the order to admit to   the hospital.    Query created by: Shivani Medina on 2021 9:52 AM      Electronically signed by:  Rochelle Huang MD 2021 7:02 PM

## 2021-01-12 ENCOUNTER — APPOINTMENT (OUTPATIENT)
Dept: CT IMAGING | Age: 41
End: 2021-01-12
Payer: MEDICAID

## 2021-01-12 ENCOUNTER — HOSPITAL ENCOUNTER (EMERGENCY)
Age: 41
Discharge: HOME | End: 2021-01-12
Attending: EMERGENCY MEDICINE
Payer: MEDICAID

## 2021-01-12 VITALS
DIASTOLIC BLOOD PRESSURE: 97 MMHG | TEMPERATURE: 98.3 F | HEIGHT: 70 IN | OXYGEN SATURATION: 100 % | RESPIRATION RATE: 16 BRPM | WEIGHT: 293 LBS | HEART RATE: 98 BPM | BODY MASS INDEX: 41.95 KG/M2 | SYSTOLIC BLOOD PRESSURE: 137 MMHG

## 2021-01-12 DIAGNOSIS — K59.00 CONSTIPATION, UNSPECIFIED CONSTIPATION TYPE: ICD-10-CM

## 2021-01-12 DIAGNOSIS — U07.1 COVID-19 VIRUS INFECTION: Primary | ICD-10-CM

## 2021-01-12 LAB
ALBUMIN SERPL-MCNC: 4.1 G/DL (ref 3.5–5.2)
ALP BLD-CCNC: 77 U/L (ref 35–104)
ALT SERPL-CCNC: 28 U/L (ref 0–32)
ANION GAP SERPL CALCULATED.3IONS-SCNC: 13 MMOL/L (ref 7–16)
AST SERPL-CCNC: 20 U/L (ref 0–31)
BASOPHILS ABSOLUTE: 0.03 E9/L (ref 0–0.2)
BASOPHILS RELATIVE PERCENT: 0.3 % (ref 0–2)
BILIRUB SERPL-MCNC: 0.5 MG/DL (ref 0–1.2)
BUN BLDV-MCNC: 10 MG/DL (ref 6–20)
CALCIUM SERPL-MCNC: 9.2 MG/DL (ref 8.6–10.2)
CHLORIDE BLD-SCNC: 93 MMOL/L (ref 98–107)
CO2: 26 MMOL/L (ref 22–29)
CREAT SERPL-MCNC: 0.6 MG/DL (ref 0.5–1)
EOSINOPHILS ABSOLUTE: 0.02 E9/L (ref 0.05–0.5)
EOSINOPHILS RELATIVE PERCENT: 0.2 % (ref 0–6)
GFR AFRICAN AMERICAN: >60
GFR NON-AFRICAN AMERICAN: >60 ML/MIN/1.73
GLUCOSE BLD-MCNC: 212 MG/DL (ref 74–99)
HCT VFR BLD CALC: 42.5 % (ref 34–48)
HEMOGLOBIN: 13.5 G/DL (ref 11.5–15.5)
IMMATURE GRANULOCYTES #: 0.08 E9/L
IMMATURE GRANULOCYTES %: 0.7 % (ref 0–5)
INR BLD: 1
LYMPHOCYTES ABSOLUTE: 4.34 E9/L (ref 1.5–4)
LYMPHOCYTES RELATIVE PERCENT: 39.4 % (ref 20–42)
MAGNESIUM: 1.9 MG/DL (ref 1.6–2.6)
MCH RBC QN AUTO: 28.7 PG (ref 26–35)
MCHC RBC AUTO-ENTMCNC: 31.8 % (ref 32–34.5)
MCV RBC AUTO: 90.4 FL (ref 80–99.9)
MONOCYTES ABSOLUTE: 0.59 E9/L (ref 0.1–0.95)
MONOCYTES RELATIVE PERCENT: 5.4 % (ref 2–12)
NEUTROPHILS ABSOLUTE: 5.96 E9/L (ref 1.8–7.3)
NEUTROPHILS RELATIVE PERCENT: 54 % (ref 43–80)
PDW BLD-RTO: 14 FL (ref 11.5–15)
PLATELET # BLD: 256 E9/L (ref 130–450)
PMV BLD AUTO: 10.9 FL (ref 7–12)
POTASSIUM SERPL-SCNC: 4.3 MMOL/L (ref 3.5–5)
PROTHROMBIN TIME: 10.8 SEC (ref 9.3–12.4)
RBC # BLD: 4.7 E12/L (ref 3.5–5.5)
SODIUM BLD-SCNC: 132 MMOL/L (ref 132–146)
TOTAL PROTEIN: 7.3 G/DL (ref 6.4–8.3)
TROPONIN: <0.01 NG/ML (ref 0–0.03)
WBC # BLD: 11 E9/L (ref 4.5–11.5)

## 2021-01-12 PROCEDURE — 84484 ASSAY OF TROPONIN QUANT: CPT

## 2021-01-12 PROCEDURE — 96374 THER/PROPH/DIAG INJ IV PUSH: CPT

## 2021-01-12 PROCEDURE — 71275 CT ANGIOGRAPHY CHEST: CPT

## 2021-01-12 PROCEDURE — 6360000002 HC RX W HCPCS: Performed by: STUDENT IN AN ORGANIZED HEALTH CARE EDUCATION/TRAINING PROGRAM

## 2021-01-12 PROCEDURE — 6360000004 HC RX CONTRAST MEDICATION: Performed by: RADIOLOGY

## 2021-01-12 PROCEDURE — 80053 COMPREHEN METABOLIC PANEL: CPT

## 2021-01-12 PROCEDURE — 83735 ASSAY OF MAGNESIUM: CPT

## 2021-01-12 PROCEDURE — 93005 ELECTROCARDIOGRAM TRACING: CPT | Performed by: STUDENT IN AN ORGANIZED HEALTH CARE EDUCATION/TRAINING PROGRAM

## 2021-01-12 PROCEDURE — 85610 PROTHROMBIN TIME: CPT

## 2021-01-12 PROCEDURE — 85025 COMPLETE CBC W/AUTO DIFF WBC: CPT

## 2021-01-12 PROCEDURE — 99285 EMERGENCY DEPT VISIT HI MDM: CPT

## 2021-01-12 RX ORDER — MAGNESIUM CITRATE
150 SOLUTION, ORAL ORAL ONCE
Qty: 150 ML | Refills: 0 | Status: SHIPPED | OUTPATIENT
Start: 2021-01-12 | End: 2021-01-12

## 2021-01-12 RX ORDER — DIPHENHYDRAMINE HYDROCHLORIDE 50 MG/ML
25 INJECTION INTRAMUSCULAR; INTRAVENOUS ONCE
Status: COMPLETED | OUTPATIENT
Start: 2021-01-12 | End: 2021-01-12

## 2021-01-12 RX ADMIN — IOPAMIDOL 75 ML: 755 INJECTION, SOLUTION INTRAVENOUS at 21:47

## 2021-01-12 RX ADMIN — DIPHENHYDRAMINE HYDROCHLORIDE 25 MG: 50 INJECTION, SOLUTION INTRAMUSCULAR; INTRAVENOUS at 20:02

## 2021-01-12 NOTE — ED NOTES
Bed: 17  Expected date:   Expected time:   Means of arrival:   Comments:  Hold terminal     Jaja Perez, KYLE  01/12/21 9334

## 2021-01-13 ENCOUNTER — CARE COORDINATION (OUTPATIENT)
Dept: CASE MANAGEMENT | Age: 41
End: 2021-01-13

## 2021-01-13 LAB
EKG ATRIAL RATE: 103 BPM
EKG P AXIS: 50 DEGREES
EKG P-R INTERVAL: 136 MS
EKG Q-T INTERVAL: 366 MS
EKG QRS DURATION: 80 MS
EKG QTC CALCULATION (BAZETT): 479 MS
EKG R AXIS: 41 DEGREES
EKG T AXIS: 2 DEGREES
EKG VENTRICULAR RATE: 103 BPM

## 2021-01-13 NOTE — ED PROVIDER NOTES
[address]      Pt Name: Willem Cloud  MRN: [id number]  [place] 1980  Date of evaluation: 1/12/2021      CHIEF COMPLAINT       Chief Complaint   Patient presents with    Other     Sent in by PCP to rule out PE, recently was dc from here with covid. HPI  Willem Cloud is a 36 y.o. female who tested positive for Covid [place] months admitted to the hospital and discharged 1 week prior to presentation today presents with complaints of constipation, bilateral leg cramping, and shortness of breath with exertion for 3 days. Patient called her primary care physician who told her to come to the emergency room to be evaluated for pulmonary embolism. Patient is describes her symptoms of shortness of breath as moderate, and progressively improving. Worse with exertion. Alleviated with rest. She was discharged from the hospital on 6 L nasal cannula at home as well weaned herself to 3 L nasal cannula. Patient also asked that she has not had a bowel movement in 3 days. She states that she is still able to pass gas. Admits to no other symptoms. Denies any fevers, chills, nausea, vomiting, chest pain, abdominal pain, flank pain, dysuria, hematuria, diarrhea, new rashes or sores. Except as noted above the remainder of the review of systems was reviewed and negative. Review of Systems   Constitutional: Negative for chills and fever. HENT: Negative for ear pain, sinus pressure and sore throat. Eyes: Negative for visual disturbance. Respiratory: Negative for cough, shortness of breath and wheezing. Cardiovascular: Negative for chest pain, palpitations and leg swelling. Gastrointestinal: Positive for constipation. Negative for abdominal distention, abdominal pain, diarrhea, nausea and vomiting. Endocrine: Negative for polyuria. Genitourinary: Negative for dysuria and frequency. Musculoskeletal: Negative for arthralgias and back pain. Bilateral leg cramping   Skin: Negative for color change, pallor, rash and wound. Neurological: Negative for weakness and headaches. Hematological: Negative for adenopathy. Psychiatric/Behavioral: Negative for agitation. All other systems reviewed and are negative. Physical Exam  Vitals signs and nursing note reviewed. Constitutional:       General: She is not in acute distress. Appearance: Normal appearance. She is obese. She is not ill-appearing or diaphoretic. HENT:      Head: Normocephalic and atraumatic. Nose: Nose normal.   Eyes:      Extraocular Movements: Extraocular movements intact. Pupils: Pupils are equal, round, and reactive to light. Neck:      Musculoskeletal: Normal range of motion. Cardiovascular:      Rate and Rhythm: Normal rate and regular rhythm. Pulses: Normal pulses. Heart sounds: Normal heart sounds. No murmur. No friction rub. No gallop. Pulmonary:      Effort: Pulmonary effort is normal. No respiratory distress. Breath sounds: Normal breath sounds. No stridor. No wheezing, rhonchi or rales. Chest:      Chest wall: No tenderness. Abdominal:      General: Bowel sounds are normal. There is no distension. Palpations: Abdomen is soft. There is no mass. Tenderness: There is no abdominal tenderness. There is no right CVA tenderness, left CVA tenderness, guarding or rebound. Negative signs include Morris's sign, Rovsing's sign and McBurney's sign. Hernia: No hernia is present. Musculoskeletal: Normal range of motion. General: No deformity. Right lower leg: No edema. Left lower leg: No edema. Skin:     General: Skin is warm and dry. Capillary Refill: Capillary refill takes less than 2 seconds. Neurological:      General: No focal deficit present. Mental Status: She is alert and oriented to person, place, and time. Psychiatric:         Mood and Affect: Mood normal.          Procedures     MDM  Number of Diagnoses or Management Options  Constipation, unspecified constipation type  COVID-19 virus infection  Diagnosis management comments: 80-year-old female with a pertinent past medical history of COVID-19 illness who was discharged from the hospital last week on 6 L nasal cannula and is now weaned herself to 3 L nasal cannula presents with complaints of constipation, bilateral leg pain and cramping and shortness of breath. She spoke with her primary care physician about her symptoms and there is recommendation she come to emergency room to be evaluated for pulmonary embolism. Vitals within normal limits. On physical exam patient is in no acute distress, speaking full sentences, alert and oriented x3. Lungs clear to auscultation bilaterally. No wheezes rales or rhonchi appreciated. Normal S1-S2. Abdomen soft nontender, no rebound or guarding. Negative CVA test bilaterally. No bilateral leg edema. No tenderness palpation of the patient's callus. Diagnostic labs unremarkable. CTA pulmonary shows no signs of pulmonary emboli. EKG shows sinus tachycardia. Troponin negative. Patient informed of all results evaluation. It is unlikely that she has a pulmonary embolism. In addition she was given mag citrate for home use as she has been cleared. She was also recommended to drink more fluids as well as the more foods high in fiber. She is agreeable plan for discharge. Patient is awake alert, hemodynamic stable, afebrile and in no respiratory distress. Discussed with patient plan for close outpatient follow-up with the patient's PCP as well as return precautions and the patient understands and agrees to the plan.     ED Course as of Jan 14 0412 Tue Jan 12, 2021 2228 CTA PULMONARY W CONTRAST [JV]      ED Course User Index  [JV] Paticia Bumpers, MD           ED Course as of Jan 14 0412 Tue Jan 12, 2021 [address] [J]      ED Course User Index  [JV] Katty Girard MD       --------------------------------------------- PAST HISTORY ---------------------------------------------  Past Medical History:  has a past medical history of Bone spur, DDD (degenerative disc disease), lumbar, and Thyroid disease. Past Surgical History:  has a past surgical history that includes Tonsillectomy; Hysterectomy; other surgical history (Left, 7/31/2015); and knee surgery. Social History:  reports that she has never smoked. She has never used smokeless tobacco. She reports that she does not drink alcohol or use drugs. Family History: family history includes Arthritis in an other family member; Depression in an other family member; Diabetes in her father and another family member; Heart Disease in her mother and another family member; Hypertension in an other family member; Stroke in an other family member. The patients home medications have been reviewed. Allergies:  Other and Iodine    -------------------------------------------------- RESULTS -------------------------------------------------  Labs:  Results for orders placed or performed during the hospital encounter of 01/12/21   CBC Auto Differential   Result Value Ref Range    WBC 11.0 4.5 - 11.5 E9/L    RBC 4.70 3.50 - 5.50 E12/L    Hemoglobin 13.5 11.5 - 15.5 g/dL    Hematocrit 42.5 34.0 - 48.0 %    MCV 90.4 80.0 - 99.9 fL    MCH 28.7 26.0 - 35.0 pg    MCHC 31.8 (L) 32.0 - 34.5 %    RDW 14.0 11.5 - 15.0 fL    Platelets 540 123 - 014 E9/L    MPV 10.9 7.0 - 12.0 fL    Neutrophils % 54.0 43.0 - 80.0 %    Immature Granulocytes % 0.7 0.0 - 5.0 %    Lymphocytes % 39.4 20.0 - 42.0 %    Monocytes % 5.4 2.0 - 12.0 %    Eosinophils % 0.2 0.0 - 6.0 %    Basophils % 0.3 0.0 - 2.0 %    Neutrophils Absolute 5.96 1.80 - 7.30 E9/L    Immature Granulocytes # 0.08 E9/L    Lymphocytes Absolute 4.34 (H) 1.50 - 4.00 E9/L Oxygen Saturation Interpretation: normal      ------------------------------------------ PROGRESS NOTES ------------------------------------------    I have spoken with the patient and discussed todays results, in addition to providing specific details for the plan of care and counseling regarding the diagnosis and prognosis. Their questions are answered at this time and they are agreeable with the plan. I discussed at length with them reasons for immediate return here for re evaluation. They will followup with their PCP by calling their office tomorrow. --------------------------------- ADDITIONAL PROVIDER NOTES ---------------------------------  At this time the patient is without objective evidence of an acute process requiring hospitalization or inpatient management. They have remained hemodynamically stable throughout their entire ED visit and are stable for discharge with outpatient follow-up. The plan has been discussed in detail and they are aware of the specific conditions for emergent return, as well as the importance of follow-up. Discharge Medication List as of 1/12/2021 10:58 PM      START taking these medications    Details   magnesium citrate (CITROMA) SOLN Take 150 mLs by mouth once for 1 dose, Disp-150 mL, R-0Normal             Diagnosis:  1. COVID-19 virus infection    2. Constipation, unspecified constipation type        Disposition:  Patient's disposition: Discharge to home  Patient's condition is stable.       ATTENDING PROVIDER ATTESTATION:     Kathe Pallas presented to the emergency department for evaluation of Other (Sent in by PCP to rule out PE, recently was dc from here with covid.) I have reviewed and discussed the case, including pertinent history (medical, surgical, family and social) and exam findings with the Resident and the Nurse assigned to Julio Pacheco. I have personally performed and/or participated in the history, exam, medical decision making, and procedures and agree with all pertinent clinical information. I have reviewed my findings and recommendations with Julio Pacheco and members of family present at the time of disposition. MDM: Supportive care, will obtain appropriate labs and imaging to assess patient's Other (Sent in by PCP to rule out PE, recently was dc from here with covid.)       My findings/plan: The primary encounter diagnosis was COVID-19 virus infection. A diagnosis of Constipation, unspecified constipation type was also pertinent to this visit.   Discharge Medication List as of 1/12/2021 10:58 PM      START taking these medications    Details   magnesium citrate (CITROMA) SOLN Take 150 mLs by mouth once for 1 dose, Disp-150 mL, R-0Normal           DO Betsy Ortega MD  Resident  01/14/21 [address] [place] [address]  01/16/21 9798

## 2021-01-13 NOTE — CARE COORDINATION
Patient contacted regarding COVID-19 risk and screening. Discussed COVID-19 related testing which was available at this time. Test results were positive. Patient informed of results, if available? Yes. Care Transition Nurse/ Ambulatory Care Manager contacted the patient by telephone to perform follow-up assessment. Verified name and  with patient as identifiers. Patient has following risk factors of: sepsis and acute respiratory failure. Symptoms reviewed with patient who verbalized the following symptoms: fever, fatigue and no worsening symptoms. Due to no new or worsening symptoms encounter was not routed to provider for escalation. Education provided regarding infection prevention, and signs and symptoms of COVID-19 and when to seek medical attention with patient who verbalized understanding. Discussed exposure protocols and quarantine from [address] you at higher risk for severe illness  and given an opportunity for questions and concerns. The patient agrees to contact the COVID-19 hotline [phone] or PCP office for questions related to their healthcare. CTN/ACM provided contact information for future reference. From CDC: Are you at higher risk for severe illness?  Wash your hands often.  Avoid close contact (6 feet, which is about two arm lengths) with people who are sick.  Put distance between yourself and other people if COVID-19 is spreading in your community.  Clean and disinfect frequently touched surfaces.  Avoid all cruise travel and non-essential air travel.  Call your healthcare professional if you have concerns about COVID-19 and your underlying condition or if you are sick. For more information on steps you can take to protect yourself, see CDC's How to Renetta Temple 364 with patient today 21 for ED discharge follow up for constipation/COVID. Patient states she is feeling better and offers no complaints.  Confirmed with

## 2021-01-13 NOTE — ED NOTES
Patient will be ready for ct at [address] [place] [address] Mitul SIEGEL RN  01/12/21 2002

## 2021-01-13 NOTE — ED NOTES
Report to [facility] RN, aware patient will need medicated 1 hr prior to CTA after labs result     Lupe Ribeiro RN  01/12/21 2585

## 2021-01-14 ASSESSMENT — ENCOUNTER SYMPTOMS
SHORTNESS OF BREATH: 0
VOMITING: 0
DIARRHEA: 0
COLOR CHANGE: 0
SORE THROAT: 0
ABDOMINAL DISTENTION: 0
CONSTIPATION: 1
WHEEZING: 0
SINUS PRESSURE: 0
COUGH: 0
NAUSEA: 0
BACK PAIN: 0
ABDOMINAL PAIN: 0

## 2021-02-01 ENCOUNTER — APPOINTMENT (OUTPATIENT)
Dept: GENERAL RADIOLOGY | Age: 41
End: 2021-02-01
Payer: MEDICAID

## 2021-02-01 ENCOUNTER — HOSPITAL ENCOUNTER (EMERGENCY)
Age: 41
Discharge: HOME | End: 2021-02-02
Attending: EMERGENCY MEDICINE
Payer: MEDICAID

## 2021-02-01 DIAGNOSIS — U07.1 COVID-19: Primary | ICD-10-CM

## 2021-02-01 LAB
ALBUMIN SERPL-MCNC: 4.1 G/DL (ref 3.5–5.2)
ALP BLD-CCNC: 96 U/L (ref 35–104)
ALT SERPL-CCNC: 19 U/L (ref 0–32)
ANION GAP SERPL CALCULATED.3IONS-SCNC: 12 MMOL/L (ref 7–16)
AST SERPL-CCNC: 17 U/L (ref 0–31)
BASOPHILS ABSOLUTE: 0.04 E9/L (ref 0–0.2)
BASOPHILS RELATIVE PERCENT: 0.3 % (ref 0–2)
BILIRUB SERPL-MCNC: 0.2 MG/DL (ref 0–1.2)
BUN BLDV-MCNC: 8 MG/DL (ref 6–20)
CALCIUM SERPL-MCNC: 9.3 MG/DL (ref 8.6–10.2)
CHLORIDE BLD-SCNC: 97 MMOL/L (ref 98–107)
CO2: 29 MMOL/L (ref 22–29)
CREAT SERPL-MCNC: 0.6 MG/DL (ref 0.5–1)
D DIMER: 449 NG/ML DDU
EOSINOPHILS ABSOLUTE: 0.11 E9/L (ref 0.05–0.5)
EOSINOPHILS RELATIVE PERCENT: 0.9 % (ref 0–6)
GFR AFRICAN AMERICAN: >60
GFR NON-AFRICAN AMERICAN: >60 ML/MIN/1.73
GLUCOSE BLD-MCNC: 310 MG/DL (ref 74–99)
HCT VFR BLD CALC: 37.4 % (ref 34–48)
HEMOGLOBIN: 12.2 G/DL (ref 11.5–15.5)
IMMATURE GRANULOCYTES #: 0.09 E9/L
IMMATURE GRANULOCYTES %: 0.7 % (ref 0–5)
LYMPHOCYTES ABSOLUTE: 4.18 E9/L (ref 1.5–4)
LYMPHOCYTES RELATIVE PERCENT: 33.7 % (ref 20–42)
MCH RBC QN AUTO: 29.5 PG (ref 26–35)
MCHC RBC AUTO-ENTMCNC: 32.6 % (ref 32–34.5)
MCV RBC AUTO: 90.6 FL (ref 80–99.9)
MONOCYTES ABSOLUTE: 0.59 E9/L (ref 0.1–0.95)
MONOCYTES RELATIVE PERCENT: 4.8 % (ref 2–12)
NEUTROPHILS ABSOLUTE: 7.4 E9/L (ref 1.8–7.3)
NEUTROPHILS RELATIVE PERCENT: 59.6 % (ref 43–80)
PDW BLD-RTO: 14.5 FL (ref 11.5–15)
PLATELET # BLD: 415 E9/L (ref 130–450)
PMV BLD AUTO: 10.5 FL (ref 7–12)
POTASSIUM SERPL-SCNC: 3.6 MMOL/L (ref 3.5–5)
PRO-BNP: 10 PG/ML (ref 0–125)
RBC # BLD: 4.13 E12/L (ref 3.5–5.5)
SODIUM BLD-SCNC: 138 MMOL/L (ref 132–146)
TOTAL PROTEIN: 7.9 G/DL (ref 6.4–8.3)
TROPONIN: <0.01 NG/ML (ref 0–0.03)
WBC # BLD: 12.4 E9/L (ref 4.5–11.5)

## 2021-02-01 PROCEDURE — 83880 ASSAY OF NATRIURETIC PEPTIDE: CPT

## 2021-02-01 PROCEDURE — 96374 THER/PROPH/DIAG INJ IV PUSH: CPT

## 2021-02-01 PROCEDURE — 80053 COMPREHEN METABOLIC PANEL: CPT

## 2021-02-01 PROCEDURE — 99284 EMERGENCY DEPT VISIT MOD MDM: CPT

## 2021-02-01 PROCEDURE — 85378 FIBRIN DEGRADE SEMIQUANT: CPT

## 2021-02-01 PROCEDURE — 85025 COMPLETE CBC W/AUTO DIFF WBC: CPT

## 2021-02-01 PROCEDURE — 99283 EMERGENCY DEPT VISIT LOW MDM: CPT

## 2021-02-01 PROCEDURE — 71045 X-RAY EXAM CHEST 1 VIEW: CPT

## 2021-02-01 PROCEDURE — 93005 ELECTROCARDIOGRAM TRACING: CPT | Performed by: EMERGENCY MEDICINE

## 2021-02-01 PROCEDURE — 36415 COLL VENOUS BLD VENIPUNCTURE: CPT

## 2021-02-01 PROCEDURE — 84484 ASSAY OF TROPONIN QUANT: CPT

## 2021-02-01 ASSESSMENT — PAIN DESCRIPTION - DESCRIPTORS: DESCRIPTORS: TENDER

## 2021-02-01 ASSESSMENT — PAIN SCALES - GENERAL: PAINLEVEL_OUTOF10: 7

## 2021-02-01 ASSESSMENT — ENCOUNTER SYMPTOMS
SHORTNESS OF BREATH: 1
ABDOMINAL PAIN: 0
CHEST TIGHTNESS: 0

## 2021-02-02 ENCOUNTER — APPOINTMENT (OUTPATIENT)
Dept: CT IMAGING | Age: 41
End: 2021-02-02
Payer: MEDICAID

## 2021-02-02 VITALS
TEMPERATURE: 98.7 F | SYSTOLIC BLOOD PRESSURE: 132 MMHG | HEART RATE: 101 BPM | WEIGHT: 293 LBS | RESPIRATION RATE: 18 BRPM | BODY MASS INDEX: 41.95 KG/M2 | DIASTOLIC BLOOD PRESSURE: 78 MMHG | HEIGHT: 70 IN | OXYGEN SATURATION: 95 %

## 2021-02-02 PROCEDURE — 6360000002 HC RX W HCPCS: Performed by: EMERGENCY MEDICINE

## 2021-02-02 PROCEDURE — 71275 CT ANGIOGRAPHY CHEST: CPT

## 2021-02-02 PROCEDURE — 6360000004 HC RX CONTRAST MEDICATION: Performed by: RADIOLOGY

## 2021-02-02 RX ORDER — DIPHENHYDRAMINE HYDROCHLORIDE 50 MG/ML
50 INJECTION INTRAMUSCULAR; INTRAVENOUS ONCE
Status: COMPLETED | OUTPATIENT
Start: 2021-02-02 | End: 2021-02-02

## 2021-02-02 RX ORDER — DIPHENHYDRAMINE HYDROCHLORIDE 50 MG/ML
25 INJECTION INTRAMUSCULAR; INTRAVENOUS ONCE
Status: DISCONTINUED | OUTPATIENT
Start: 2021-02-02 | End: 2021-02-02

## 2021-02-02 RX ADMIN — DIPHENHYDRAMINE HYDROCHLORIDE 50 MG: 50 INJECTION, SOLUTION INTRAMUSCULAR; INTRAVENOUS at 00:33

## 2021-02-02 RX ADMIN — IOPAMIDOL 80 ML: 755 INJECTION, SOLUTION INTRAVENOUS at 01:47

## 2021-02-02 NOTE — ED NOTES
Patient reports pain and swelling to chest right below left clavicle. Patient has pain upon palpation.       Matthieu Cuadra RN  02/01/21 7530

## 2021-02-02 NOTE — ED NOTES
Discharge and follow-up instructions reviewed with patient. Pt questions/concerns answered. Pt verbalized understanding.         Lona Reina RN  02/02/21 9829

## 2021-02-02 NOTE — ED NOTES
Pt presents to ED with c/o SOB and CP. Pt reports continued SOB since covid dx in December. Pt wears O2 intermittently at night while at home. Pt denies any new/increased SOB. Breath sounds CTA. Respirations regular and unlabored. Pt reports CP starting a few weeks ago. Pt reports pain as mid-sternal and non-radiating. Pt describes pain as intermittently sharp. Pt states, \"it feels like theres a lump there. \" Pt reports pain with palpation/pressure. Heart sounds WNL. NSR per EKG and telemetry. Pt denies any other symptoms at this time. Pt placed on cardiac monitor and continuous pulse oximetry. Pt is A&O x 4. Call light within reach. Bed In lowest position. Both side-rails up. NAD noted. Will continue to monitor.         Jose Villarreal RN  02/01/21 6289

## 2021-02-02 NOTE — ED PROVIDER NOTES
37 yo female presenting with a few days of chest tightness originally. She thinks is related to the coughing that she is. Now is more of an achiness that she is feeling. No heart problems, was diagnosed with Covid 3 weeks ago. Was sent home and has been using couple liters of oxygen all the time. It is worse when she walks around but when she sitting there her oxygen is in the high 90s. She is morbidly obese, but denies any significant heart or lung problems otherwise. Awake, alert, oriented x4. Been chronic, gradual, persistent, mild to moderate severity, several days duration, associated with exertional shortness of breath. Family History   Problem Relation Age of Onset    Heart Disease Mother     Diabetes Father     Arthritis Other     Depression Other     Diabetes Other     Heart Disease Other     Hypertension Other     Stroke Other      Past Surgical History:   Procedure Laterality Date    HYSTERECTOMY      KNEE SURGERY      OTHER SURGICAL HISTORY Left 7/31/2015    EXCISION LEFT WRIST MASS    TONSILLECTOMY         Review of Systems   Constitutional: Negative for chills and fever. HENT: Negative. Respiratory: Positive for shortness of breath. Negative for chest tightness. Cardiovascular: Positive for chest pain. Gastrointestinal: Negative for abdominal pain. All other systems reviewed and are negative. Physical Exam  Constitutional:       General: She is not in acute distress. Appearance: She is well-developed. She is obese. HENT:      Head: Normocephalic and atraumatic. Eyes:      Conjunctiva/sclera: Conjunctivae normal.      Pupils: Pupils are equal, round, and reactive to light. Neck:      Musculoskeletal: Normal range of motion. Thyroid: No thyromegaly. Cardiovascular:      Rate and Rhythm: Regular rhythm. Tachycardia present. Pulmonary:      Effort: Pulmonary effort is normal. No tachypnea, accessory muscle usage or respiratory distress. Breath sounds: Normal breath sounds. Comments: % on room air while sitting, nasal cannula at the bedside but she is not requiring it now  Abdominal:      General: There is no distension. Palpations: Abdomen is soft. Tenderness: There is no abdominal tenderness. There is no guarding or rebound. Musculoskeletal: Normal range of motion. General: No tenderness. Skin:     General: Skin is warm and dry. Findings: No erythema. Neurological:      Mental Status: She is alert and oriented to person, place, and time. Cranial Nerves: No cranial nerve deficit. Coordination: Coordination normal.          Procedures     MDM     ED Course as of Feb 02 0502 Tue Feb 02, 2021 0002 EKG: Interpreted by meSinus rhythm, rate of 90, normal axis, no ST elevations or depressions, nonspecific T wave changes, slightly prolonged QT at 479. No changes compared to prior from 1/12/2021. [SO]      ED Course User Index  [SO] Maggie Baum DO      ED Course as of Feb 02 0502 Tue Feb 02, 2021 0002 EKG: Interpreted by meSinus rhythm, rate of 90, normal axis, no ST elevations or depressions, nonspecific T wave changes, slightly prolonged QT at 479. No changes compared to prior from 1/12/2021. [SO]      ED Course User Index  [SO] Maggie Baum DO       --------------------------------------------- PAST HISTORY ---------------------------------------------  Past Medical History:  has a past medical history of Bone spur, DDD (degenerative disc disease), lumbar, and Thyroid disease. Past Surgical History:  has a past surgical history that includes Tonsillectomy; Hysterectomy; other surgical history (Left, 7/31/2015); and knee surgery. Social History:  reports that she has never smoked. She has never used smokeless tobacco. She reports that she does not drink alcohol or use drugs. Family History: family history includes Arthritis in an other family member; Depression in an other family member; Diabetes in her father and another family member; Heart Disease in her mother and another family member; Hypertension in an other family member; Stroke in an other family member. The patients home medications have been reviewed. Allergies:  Other and Iodine    -------------------------------------------------- RESULTS -------------------------------------------------  Labs:  Results for orders placed or performed during the hospital encounter of 02/01/21   CBC auto differential   Result Value Ref Range    WBC 12.4 (H) 4.5 - 11.5 E9/L    RBC 4.13 3.50 - 5.50 E12/L    Hemoglobin 12.2 11.5 - 15.5 g/dL    Hematocrit 37.4 34.0 - 48.0 %    MCV 90.6 80.0 - 99.9 fL    MCH 29.5 26.0 - 35.0 pg    MCHC 32.6 32.0 - 34.5 %    RDW 14.5 11.5 - 15.0 fL    Platelets 037 292 - 012 E9/L    MPV 10.5 7.0 - 12.0 fL    Neutrophils % 59.6 43.0 - 80.0 %    Immature Granulocytes % 0.7 0.0 - 5.0 %    Lymphocytes % 33.7 20.0 - 42.0 %    Monocytes % 4.8 2.0 - 12.0 %    Eosinophils % 0.9 0.0 - 6.0 %    Basophils % 0.3 0.0 - 2.0 %    Neutrophils Absolute 7.40 (H) 1.80 - 7.30 E9/L    Immature Granulocytes # 0.09 E9/L    Lymphocytes Absolute 4.18 (H) 1.50 - 4.00 E9/L    Monocytes Absolute 0.59 0.10 - 0.95 E9/L    Eosinophils Absolute 0.11 0.05 - 0.50 E9/L    Basophils Absolute 0.04 0.00 - 0.20 E9/L   Comprehensive metabolic panel   Result Value Ref Range    Sodium 138 132 - 146 mmol/L    Potassium 3.6 3.5 - 5.0 mmol/L    Chloride 97 (L) 98 - 107 mmol/L    CO2 29 22 - 29 mmol/L    Anion Gap 12 7 - 16 mmol/L    Glucose 310 (H) 74 - 99 mg/dL    BUN 8 6 - 20 mg/dL    CREATININE 0.6 0.5 - 1.0 mg/dL    GFR Non-African American >60 >=60 mL/min/1.73    GFR African American >60     Calcium 9.3 8.6 - 10.2 mg/dL    Total Protein 7.9 6.4 - 8.3 g/dL    Albumin 4.1 3.5 - 5.2 g/dL    Total Bilirubin 0.2 0.0 - 1.2 mg/dL Alkaline Phosphatase 96 35 - 104 U/L    ALT 19 0 - 32 U/L    AST 17 0 - 31 U/L   Troponin   Result Value Ref Range    Troponin <0.01 0.00 - 0.03 ng/mL   Brain Natriuretic Peptide   Result Value Ref Range    Pro-BNP 10 0 - 125 pg/mL   D-Dimer, Quantitative   Result Value Ref Range    D-Dimer, Quant 449 ng/mL DDU   EKG 12 Lead   Result Value Ref Range    Ventricular Rate 90 BPM    Atrial Rate 90 BPM    P-R Interval 160 ms    QRS Duration 88 ms    Q-T Interval 392 ms    QTc Calculation (Bazett) 479 ms    P Axis 48 degrees    R Axis 20 degrees    T Axis 0 degrees       Radiology:  CTA CHEST W CONTRAST   Final Result   No evidence of pulmonary embolism. Small peripheral ground-glass densities in the bilateral lungs, suspicious   for multifocal pneumonia such as COVID-19. Diffuse hepatic steatosis. XR CHEST PORTABLE   Final Result   Markedly improved aeration of the lungs since prior exam on December 30, 2020   with persistent mild peripheral ill-defined opacities. Remainder of the   study is stable. RECOMMENDATION:   (Recommend upright PA and lateral chest radiographs 6-8 weeks after   resolution of patient's symptoms to ensure complete resolution of   radiographic findings. )          ------------------------- NURSING NOTES AND VITALS REVIEWED ---------------------------  Date / Time Roomed:  2/1/2021 10:27 PM  ED Bed Assignment:  16/16    The nursing notes within the ED encounter and vital signs as below have been reviewed.    /78   Pulse 101   Temp 98.7 °F (37.1 °C) (Oral)   Resp 18   Ht 5' 10\" (1.778 m)   Wt (!) 371 lb (168.3 kg)   SpO2 95%   BMI 53.23 kg/m²   Oxygen Saturation Interpretation: Normal      ------------------------------------------ PROGRESS NOTES ------------------------------------------ I have spoken with the patient and discussed todays results, in addition to providing specific details for the plan of care and counseling regarding the diagnosis and prognosis. Their questions are answered at this time and they are agreeable with the plan. I discussed at length with them reasons for immediate return here for re evaluation. They will followup with primary care by calling their office tomorrow. Patient without any significant hypoxia, she continues to use the oxygen at home, with her morbid obesity this is certainly part of the problem as well. No acute cardiac or pulmonary abnormalities that are requiring urgent adventure hospitalization at this time. She understands return to the ED for any problems any worsening.  --------------------------------- ADDITIONAL PROVIDER NOTES ---------------------------------  At this time the patient is without objective evidence of an acute process requiring hospitalization or inpatient management. They have remained hemodynamically stable throughout their entire ED visit and are stable for discharge with outpatient follow-up. The plan has been discussed in detail and they are aware of the specific conditions for emergent return, as well as the importance of follow-up. Discharge Medication List as of 2/2/2021  2:32 AM          Diagnosis:  1. COVID-19        Disposition:  Patient's disposition: Discharge to home  Patient's condition is stable.               Moni Ulloa DO  02/02/21 0502

## 2021-02-03 LAB
EKG ATRIAL RATE: 90 BPM
EKG P AXIS: 48 DEGREES
EKG P-R INTERVAL: 160 MS
EKG Q-T INTERVAL: 392 MS
EKG QRS DURATION: 88 MS
EKG QTC CALCULATION (BAZETT): 479 MS
EKG R AXIS: 20 DEGREES
EKG T AXIS: 0 DEGREES
EKG VENTRICULAR RATE: 90 BPM

## 2021-02-06 ENCOUNTER — HOSPITAL ENCOUNTER (EMERGENCY)
Age: 41
Discharge: HOME | End: 2021-02-06
Attending: STUDENT IN AN ORGANIZED HEALTH CARE EDUCATION/TRAINING PROGRAM
Payer: MEDICAID

## 2021-02-06 VITALS
RESPIRATION RATE: 16 BRPM | OXYGEN SATURATION: 96 % | DIASTOLIC BLOOD PRESSURE: 100 MMHG | TEMPERATURE: 97.1 F | HEART RATE: 102 BPM | SYSTOLIC BLOOD PRESSURE: 144 MMHG

## 2021-02-06 DIAGNOSIS — A59.01 TRICHOMONAS VAGINITIS: Primary | ICD-10-CM

## 2021-02-06 DIAGNOSIS — B96.89 BACTERIAL VAGINOSIS: ICD-10-CM

## 2021-02-06 DIAGNOSIS — N76.0 BACTERIAL VAGINOSIS: ICD-10-CM

## 2021-02-06 LAB
BACTERIA: ABNORMAL /HPF
BILIRUBIN URINE: NEGATIVE
BLOOD, URINE: ABNORMAL
CLARITY: CLEAR
CLUE CELLS: ABNORMAL
COLOR: ABNORMAL
GLUCOSE URINE: 500 MG/DL
KETONES, URINE: ABNORMAL MG/DL
LEUKOCYTE ESTERASE, URINE: ABNORMAL
NITRITE, URINE: NEGATIVE
PH UA: 5.5 (ref 5–9)
PROTEIN UA: ABNORMAL MG/DL
RBC UA: ABNORMAL /HPF (ref 0–2)
SOURCE WET PREP: ABNORMAL
SPECIFIC GRAVITY UA: >=1.03 (ref 1–1.03)
TRICHOMONAS PREP: ABNORMAL
UROBILINOGEN, URINE: 0.2 E.U./DL
WBC UA: ABNORMAL /HPF (ref 0–5)
YEAST WET PREP: ABNORMAL

## 2021-02-06 PROCEDURE — 87591 N.GONORRHOEAE DNA AMP PROB: CPT

## 2021-02-06 PROCEDURE — 87088 URINE BACTERIA CULTURE: CPT

## 2021-02-06 PROCEDURE — 87186 SC STD MICRODIL/AGAR DIL: CPT

## 2021-02-06 PROCEDURE — 81001 URINALYSIS AUTO W/SCOPE: CPT

## 2021-02-06 PROCEDURE — 87491 CHLMYD TRACH DNA AMP PROBE: CPT

## 2021-02-06 PROCEDURE — 87210 SMEAR WET MOUNT SALINE/INK: CPT

## 2021-02-06 PROCEDURE — G0382 LEV 3 HOSP TYPE B ED VISIT: HCPCS

## 2021-02-06 RX ORDER — MELOXICAM 15 MG/1
15 TABLET ORAL DAILY
COMMUNITY
End: 2022-01-03 | Stop reason: ALTCHOICE

## 2021-02-06 RX ORDER — METRONIDAZOLE 500 MG/1
500 TABLET ORAL 2 TIMES DAILY
Qty: 14 TABLET | Refills: 0 | Status: SHIPPED | OUTPATIENT
Start: 2021-02-06 | End: 2021-02-13

## 2021-02-06 ASSESSMENT — PAIN DESCRIPTION - PROGRESSION: CLINICAL_PROGRESSION: NOT CHANGED

## 2021-02-06 ASSESSMENT — PAIN DESCRIPTION - DESCRIPTORS: DESCRIPTORS: BURNING;DISCOMFORT;ITCHING

## 2021-02-06 ASSESSMENT — PAIN DESCRIPTION - FREQUENCY: FREQUENCY: CONTINUOUS

## 2021-02-06 ASSESSMENT — PAIN SCALES - GENERAL: PAINLEVEL_OUTOF10: 8

## 2021-02-06 ASSESSMENT — PAIN DESCRIPTION - LOCATION: LOCATION: VAGINA

## 2021-02-06 ASSESSMENT — PAIN DESCRIPTION - PAIN TYPE: TYPE: ACUTE PAIN

## 2021-02-06 NOTE — ED PROVIDER NOTES
Department of Emergency Medicine   ED  Provider Note  Admit Date/RoomTime: 2/6/2021  8:38 AM  ED Room: 01/01              History of Present Illness:  2/6/21, Time: 8:42 AM EST       Chief Complaint   Patient presents with    Vaginitis       vaginal itching, burning, swelling; white discharge. using monistat for 6 days with no improvement                        Callie Bran is a 36 y.o. female presenting to the ED for vaginal pain, discharge, and itching, beginning six days ago. The complaint has been constant, moderate in severity, and worsened by nothing.       The patient is a 77-year-old female who presents to the emergency department complaining of vaginal pain, itching, discharge, and swelling. She states that over the past 6 days she has been experiencing a white discharge along with increased itching. She states that this morning when she woke up she developed burning and swelling. She states that she has been using Monistat for 6 days without any relief. She states that this is her second yeast infection over the past month. She did have a yeast infection last month that was treated with Monistat. She states that prior to that she never did have a yeast infection in the past prior to this. The patient states that she has been with the same sexual partner for the past 5 years and does not have a history of STDs. However she has never had symptoms like this in the past.  She denies any fever, chills, lesions, abdominal pain, nausea, vomiting, vaginal bleeding, or other acute symptoms or concerns.   The patient states that she did have a hysterectomy in the past.     Review of Systems:   Pertinent positives and negatives are stated within HPI, all other systems reviewed and are negative.           --------------------------------------------- PAST HISTORY --------------------------------------------- Past Medical History:  has a past medical history of Bone spur, DDD (degenerative disc disease), lumbar, and Thyroid disease.     Past Surgical History:  has a past surgical history that includes Tonsillectomy; Hysterectomy; other surgical history (Left, 7/31/2015); and knee surgery.     Social History:  reports that she has never smoked. She has never used smokeless tobacco. She reports that she does not drink alcohol or use drugs.     Family History: family history includes Arthritis in an other family member; Depression in an other family member; Diabetes in her father and another family member; Heart Disease in her mother and another family member; Hypertension in an other family member; Stroke in an other family member. . Unless otherwise noted, family history is non contributory     The patients home medications have been reviewed.     Allergies: Other and Iodine     I have reviewed the past medical history, past surgical history, social history, and family history     ---------------------------------------------------PHYSICAL EXAM--------------------------------------     Constitutional/General: Alert and oriented x3  Head: Normocephalic and atraumatic  Eyes:  EOMI, sclera non icteric  Mouth: Oropharynx clear, handling secretions, no trismus, no asymmetry of the posterior oropharynx or uvular edema  Neck: Supple, full ROM, no stridor, no meningeal signs  Respiratory: Lungs clear to auscultation bilaterally, no wheezes, rales, or rhonchi. Not in respiratory distress  Cardiovascular:  Regular rate. Regular rhythm. No murmurs, no aortic murmurs, no gallops, or rubs. Chest: No chest wall tenderness  Gastrointestinal:  Abdomen Soft, Non tender, Non distended. No rebound, guarding, or rigidity. No pulsatile masses. : Thick white to yellow vaginal discharge. There are no lesions present. Malodorous. No bleeding. Musculoskeletal: Moves all extremities x 4. Warm and well perfused, no clubbing, no cyanosis, no edema. Capillary refill <3 seconds  Skin: skin warm and dry. No rashes. Neurologic: GCS 15, no focal deficits, symmetric strength 5/5 in the upper and lower extremities bilaterally  Psychiatric: Normal Affect              -------------------------------------------------- RESULTS -------------------------------------------------  I have personally reviewed all laboratory and imaging results for this patient.  Results are listed below.      LABS: (Lab results interpreted by me)        Results for orders placed or performed during the hospital encounter of 02/06/21   Wet prep, genital     Specimen: Vaginal   Result Value Ref Range     Trichomonas Prep 1+ (A)       Yeast, Wet Prep None Seen       Clue Cells, Wet Prep 2+ (A)       Source Wet Prep VAGINAL     C.trachomatis N.gonorrhoeae DNA, Urine     Specimen: Urine   Result Value Ref Range     Source Urine     Urinalysis with Microscopic   Result Value Ref Range     Color, UA DARK YELLOW (A) Straw/Yellow     Clarity, UA Clear Clear     Glucose, Ur 500 (A) Negative mg/dL     Bilirubin Urine Negative Negative     Ketones, Urine TRACE (A) Negative mg/dL     Specific Gravity, UA >=1.030 1.005 - 1.030     Blood, Urine SMALL (A) Negative     pH, UA 5.5 5.0 - 9.0     Protein, UA TRACE Negative mg/dL     Urobilinogen, Urine 0.2 <2.0 E.U./dL     Nitrite, Urine Negative Negative     Leukocyte Esterase, Urine SMALL (A) Negative     WBC, UA 2-5 0 - 5 /HPF     RBC, UA 0-1 0 - 2 /HPF     Bacteria, UA FEW (A) None Seen /HPF   ,         RADIOLOGY:  Interpreted by Radiologist unless otherwise specified  No orders to display         ------------------------- NURSING NOTES AND VITALS REVIEWED ---------------------------              The nursing notes within the ED encounter and vital signs as below have been reviewed by myself BP (!) 144/100   Pulse 102   Temp 97.1 °F (36.2 °C) (Infrared)   Resp 16   SpO2 96%      Oxygen Saturation Interpretation: 96 % on room air. Normal     The patients available past medical records and past encounters were reviewed.          ------------------------------ ED COURSE/MEDICAL DECISION MAKING----------------------  Medications - No data to display           Medical Decision Making:      The patient was seen and evaluated by the Attending Emergency Medicine Physician Dr. Evan Pfeiffer.         The patient is a 61-year-old female who presents to the emergency department complaining of vaginal pain, itching, and swelling. She is hemodynamically stable, nontoxic in appearance, in no acute distress. There is yellow to white thick vaginal discharge present on examination. Urinalysis did show few bacteria with small leukocytes. It was sent off for culture. She states that she is not having burning with urination but just burning in her vaginal region. With her being asymptomatic and few bacteria present we will hold off on antibiotics for UTI at this time until culture results. Wet prep was negative for yeast.  However, was positive for clue cells and trichomonas. Discussed results in depth with patient. Did provide her with a prescription for Flagyl. Recommended her to also get treatment for her sexual partners. She is to follow-up with OB/GYN and family doctor. Gonorrhea and Chlamydia were sent and are pending. Recommend [facility] She is to return here if she experiences any significant worsening symptoms or other acute symptoms or concerns.   The patient verbalized understanding and agreement to treatment plan discharge instructions.                 This patient's ED course included: a personal history and physicial examination and re-evaluation prior to disposition     This patient has remained hemodynamically stable during their ED course.             Counseling: The emergency provider has spoken with the patient and discussed todays results, in addition to providing specific details for the plan of care and counseling regarding the diagnosis and prognosis. Questions are answered at this time and they are agreeable with the plan.         --------------------------------- IMPRESSION AND DISPOSITION ---------------------------------     IMPRESSION  1. Trichomonas vaginitis    2. Bacterial vaginosis          DISPOSITION  Disposition: Discharge to home  Patient condition is stable        NOTE: This report was transcribed using voice recognition software.  Every effort was made to ensure accuracy; however, inadvertent computerized transcription errors may be present        Hanna Guzmán,   Resident  02/06/21 [address] [facility],   Resident  02/06/21 [id number]

## 2021-02-06 NOTE — ED NOTES
Department of Emergency Medicine   ED  Provider Note  Admit Date/RoomTime: 2/6/2021  8:38 AM  ED Room: 01/01          History of Present Illness:  2/6/21, Time: 8:42 AM EST  Chief Complaint   Patient presents with    Vaginitis     vaginal itching, burning, swelling; white discharge. using monistat for 6 days with no improvement          Thanh Irene is a 36 y.o. female presenting to the ED for vaginal pain, discharge, and itching, beginning six days ago. The complaint has been constant, moderate in severity, and worsened by nothing. The patient is a 77-year-old female who presents to the emergency department complaining of vaginal pain, itching, discharge, and swelling. She states that over the past 6 days she has been experiencing a white discharge along with increased itching. She states that this morning when she woke up she developed burning and swelling. She states that she has been using Monistat for 6 days without any relief. She states that this is her second yeast infection over the past month. She did have a yeast infection last month that was treated with Monistat. She states that prior to that she never did have a yeast infection in the past prior to this. The patient states that she has been with the same sexual partner for the past 5 years and does not have a history of STDs. However she has never had symptoms like this in the past.  She denies any fever, chills, lesions, abdominal pain, nausea, vomiting, vaginal bleeding, or other acute symptoms or concerns. The patient states that she did have a hysterectomy in the past.    Review of Systems:   Pertinent positives and negatives are stated within HPI, all other systems reviewed and are negative.        --------------------------------------------- PAST HISTORY ---------------------------------------------  Past Medical History:  has a past medical history of Bone spur, DDD (degenerative disc disease), lumbar, and Thyroid disease. Past Surgical History:  has a past surgical history that includes Tonsillectomy; Hysterectomy; other surgical history (Left, 7/31/2015); and knee surgery. Social History:  reports that she has never smoked. She has never used smokeless tobacco. She reports that she does not drink alcohol or use drugs. Family History: family history includes Arthritis in an other family member; Depression in an other family member; Diabetes in her father and another family member; Heart Disease in her mother and another family member; Hypertension in an other family member; Stroke in an other family member. . Unless otherwise noted, family history is non contributory    The patients home medications have been reviewed. Allergies: Other and Iodine    I have reviewed the past medical history, past surgical history, social history, and family history    ---------------------------------------------------PHYSICAL EXAM--------------------------------------    Constitutional/General: Alert and oriented x3  Head: Normocephalic and atraumatic  Eyes:  EOMI, sclera non icteric  Mouth: Oropharynx clear, handling secretions, no trismus, no asymmetry of the posterior oropharynx or uvular edema  Neck: Supple, full ROM, no stridor, no meningeal signs  Respiratory: Lungs clear to auscultation bilaterally, no wheezes, rales, or rhonchi. Not in respiratory distress  Cardiovascular:  Regular rate. Regular rhythm. No murmurs, no aortic murmurs, no gallops, or rubs. Chest: No chest wall tenderness  Gastrointestinal:  Abdomen Soft, Non tender, Non distended. No rebound, guarding, or rigidity. No pulsatile masses. : Thick white to yellow vaginal discharge. There are no lesions present. Malodorous. No bleeding. Musculoskeletal: Moves all extremities x 4. Warm and well perfused, no clubbing, no cyanosis, no edema. Capillary refill <3 seconds  Skin: skin warm and dry. No rashes. Neurologic: GCS 15, no focal deficits, symmetric strength 5/5 in the upper and lower extremities bilaterally  Psychiatric: Normal Affect          -------------------------------------------------- RESULTS -------------------------------------------------  I have personally reviewed all laboratory and imaging results for this patient. Results are listed below. LABS: (Lab results interpreted by me)  Results for orders placed or performed during the hospital encounter of 02/06/21   Wet prep, genital    Specimen: Vaginal   Result Value Ref Range    Trichomonas Prep 1+ (A)     Yeast, Wet Prep None Seen     Clue Cells, Wet Prep 2+ (A)     Source Wet Prep VAGINAL    C.trachomatis N.gonorrhoeae DNA, Urine    Specimen: Urine   Result Value Ref Range    Source Urine    Urinalysis with Microscopic   Result Value Ref Range    Color, UA DARK YELLOW (A) Straw/Yellow    Clarity, UA Clear Clear    Glucose, Ur 500 (A) Negative mg/dL    Bilirubin Urine Negative Negative    Ketones, Urine TRACE (A) Negative mg/dL    Specific Gravity, UA >=1.030 1.005 - 1.030    Blood, Urine SMALL (A) Negative    pH, UA 5.5 5.0 - 9.0    Protein, UA TRACE Negative mg/dL    Urobilinogen, Urine 0.2 <2.0 E.U./dL    Nitrite, Urine Negative Negative    Leukocyte Esterase, Urine SMALL (A) Negative    WBC, UA 2-5 0 - 5 /HPF    RBC, UA 0-1 0 - 2 /HPF    Bacteria, UA FEW (A) None Seen /HPF   ,       RADIOLOGY:  Interpreted by Radiologist unless otherwise specified  No orders to display       ------------------------- NURSING NOTES AND VITALS REVIEWED ---------------------------   The nursing notes within the ED encounter and vital signs as below have been reviewed by myself  BP (!) 144/100   Pulse 102   Temp 97.1 °F (36.2 °C) (Infrared)   Resp 16   SpO2 96%     Oxygen Saturation Interpretation: 96 % on room air. Normal    The patients available past medical records and past encounters were reviewed. ------------------------------ ED COURSE/MEDICAL DECISION MAKING----------------------  Medications - No data to display        Medical Decision Making:     The patient was seen and evaluated by the Attending Emergency Medicine Physician Dr. Tiny Mireles. The patient is a 80-year-old female who presents to the emergency department complaining of vaginal pain, itching, and swelling. She is hemodynamically stable, nontoxic in appearance, in no acute distress. There is yellow to white thick vaginal discharge present on examination. Urinalysis did show few bacteria with small leukocytes. It was sent off for culture. She states that she is not having burning with urination but just burning in her vaginal region. With her being asymptomatic and few bacteria present we will hold off on antibiotics for UTI at this time until culture results. Wet prep was negative for yeast.  However, was positive for clue cells and trichomonas. Discussed results in depth with patient. Did provide her with a prescription for Flagyl. Recommended her to also get treatment for her sexual partners. She is to follow-up with OB/GYN and family doctor. Gonorrhea and Chlamydia were sent and are pending. [facility] She is to return here if she experiences any significant worsening symptoms or other acute symptoms or concerns. The patient verbalized understanding and agreement to treatment plan discharge instructions. This patient's ED course included: a personal history and physicial examination and re-evaluation prior to disposition    This patient has remained hemodynamically stable during their ED course.           Counseling: The emergency provider has spoken with the patient and discussed todays results, in addition to providing specific details for the plan of care and counseling regarding the diagnosis and prognosis. Questions are answered at this time and they are agreeable with the plan.       --------------------------------- IMPRESSION AND DISPOSITION ---------------------------------    IMPRESSION  1. Trichomonas vaginitis    2. Bacterial vaginosis        DISPOSITION  Disposition: Discharge to home  Patient condition is stable      NOTE: This report was transcribed using voice recognition software.  Every effort was made to ensure accuracy; however, inadvertent computerized transcription errors may be present       Ivan Mooney DO  Resident  02/06/21 [id number]

## 2021-02-08 LAB
ORGANISM: ABNORMAL
URINE CULTURE, ROUTINE: ABNORMAL

## 2021-02-10 LAB
C. TRACHOMATIS DNA ,URINE: NEGATIVE
N. GONORRHOEAE DNA, URINE: NEGATIVE
SOURCE: NORMAL

## 2021-04-09 ENCOUNTER — HOSPITAL ENCOUNTER (OUTPATIENT)
Dept: CT IMAGING | Age: 41
Discharge: HOME | End: 2021-04-09
Payer: MEDICAID

## 2021-04-09 DIAGNOSIS — R91.8 GROUND GLASS OPACITY PRESENT ON IMAGING OF LUNG: ICD-10-CM

## 2021-04-09 PROCEDURE — 71250 CT THORAX DX C-: CPT

## 2021-07-08 ENCOUNTER — HOSPITAL ENCOUNTER (OUTPATIENT)
Age: 41
Discharge: HOME | End: 2021-07-08
Payer: MEDICAID

## 2021-07-08 LAB
CREAT SERPL-MCNC: 0.5 MG/DL (ref 0.5–1)
GFR AFRICAN AMERICAN: >60
GFR NON-AFRICAN AMERICAN: >60 ML/MIN/1.73

## 2021-07-08 PROCEDURE — 36415 COLL VENOUS BLD VENIPUNCTURE: CPT

## 2021-07-08 PROCEDURE — 82565 ASSAY OF CREATININE: CPT

## 2021-10-08 ENCOUNTER — HOSPITAL ENCOUNTER (OUTPATIENT)
Dept: CT IMAGING | Age: 41
Discharge: HOME | End: 2021-10-08
Payer: MEDICAID

## 2021-10-08 DIAGNOSIS — R91.8 LUNG NODULES: ICD-10-CM

## 2021-10-08 PROCEDURE — 71250 CT THORAX DX C-: CPT

## 2022-01-03 ENCOUNTER — HOSPITAL ENCOUNTER (EMERGENCY)
Age: 42
Discharge: HOME | End: 2022-01-03
Attending: EMERGENCY MEDICINE
Payer: MEDICAID

## 2022-01-03 VITALS
RESPIRATION RATE: 18 BRPM | OXYGEN SATURATION: 95 % | BODY MASS INDEX: 56.82 KG/M2 | HEART RATE: 99 BPM | WEIGHT: 293 LBS | TEMPERATURE: 96.2 F | DIASTOLIC BLOOD PRESSURE: 105 MMHG | SYSTOLIC BLOOD PRESSURE: 152 MMHG

## 2022-01-03 DIAGNOSIS — Z20.822 SUSPECTED COVID-19 VIRUS INFECTION: Primary | ICD-10-CM

## 2022-01-03 PROCEDURE — U0003 INFECTIOUS AGENT DETECTION BY NUCLEIC ACID (DNA OR RNA); SEVERE ACUTE RESPIRATORY SYNDROME CORONAVIRUS 2 (SARS-COV-2) (CORONAVIRUS DISEASE [COVID-19]), AMPLIFIED PROBE TECHNIQUE, MAKING USE OF HIGH THROUGHPUT TECHNOLOGIES AS DESCRIBED BY CMS-2020-01-R: HCPCS

## 2022-01-03 PROCEDURE — U0005 INFEC AGEN DETEC AMPLI PROBE: HCPCS

## 2022-01-03 PROCEDURE — 99283 EMERGENCY DEPT VISIT LOW MDM: CPT

## 2022-01-03 RX ORDER — BROMPHENIRAMINE MALEATE, PSEUDOEPHEDRINE HYDROCHLORIDE, AND DEXTROMETHORPHAN HYDROBROMIDE 2; 30; 10 MG/5ML; MG/5ML; MG/5ML
5 SYRUP ORAL 4 TIMES DAILY PRN
Qty: 120 ML | Refills: 0 | Status: SHIPPED | OUTPATIENT
Start: 2022-01-03 | End: 2022-09-20

## 2022-01-03 ASSESSMENT — ENCOUNTER SYMPTOMS
WHEEZING: 0
VOMITING: 0
EYE PAIN: 0
EYE REDNESS: 0
NAUSEA: 0
SINUS PRESSURE: 0
SORE THROAT: 0
ABDOMINAL DISTENTION: 0
SHORTNESS OF BREATH: 0
COUGH: 1
BACK PAIN: 0
DIARRHEA: 0
RHINORRHEA: 1
EYE DISCHARGE: 0

## 2022-01-03 NOTE — ED PROVIDER NOTES
SURGICAL PROCEDURE AT [place] [id number]    The history is provided by the patient. URI  Presenting symptoms: congestion, cough and rhinorrhea    Presenting symptoms: no ear pain, no facial pain, no fatigue, no fever and no sore throat    Severity:  Mild  Onset quality:  Sudden  Duration:  3 days  Chronicity:  New  Associated symptoms: no arthralgias, no headaches and no wheezing         Review of Systems   Constitutional: Negative for chills, fatigue and fever. HENT: Positive for congestion and rhinorrhea. Negative for ear pain, sinus pressure and sore throat. Eyes: Negative for pain, discharge and redness. Respiratory: Positive for cough. Negative for shortness of breath and wheezing. Cardiovascular: Negative for chest pain. Gastrointestinal: Negative for abdominal distention, diarrhea, nausea and vomiting. Genitourinary: Negative for dysuria and frequency. Musculoskeletal: Negative for arthralgias and back pain. Skin: Negative for rash and wound. Neurological: Negative for weakness and headaches. Hematological: Negative for adenopathy. All other systems reviewed and are negative. Physical Exam  Vitals and nursing note reviewed. Constitutional:       Appearance: She is well-developed. HENT:      Head: Normocephalic and atraumatic. Right Ear: Tympanic membrane is retracted. Left Ear: Tympanic membrane is retracted. Nose: Mucosal edema and congestion present. Eyes:      Pupils: Pupils are equal, round, and reactive to light. Cardiovascular:      Rate and Rhythm: Normal rate and regular rhythm. Heart sounds: Normal heart sounds. No murmur heard. Pulmonary:      Effort: Pulmonary effort is normal. No respiratory distress. Breath sounds: Normal breath sounds. No wheezing or rales. Abdominal:      General: Bowel sounds are normal.      Palpations: Abdomen is soft. Tenderness: There is no abdominal tenderness.  There is no guarding or rebound. Musculoskeletal:      Cervical back: Normal range of motion and neck supple. Skin:     General: Skin is warm and dry. Neurological:      Mental Status: She is alert and oriented to person, place, and time. Cranial Nerves: No cranial nerve deficit. Coordination: Coordination normal.          Procedures     [facility]          --------------------------------------------- PAST HISTORY ---------------------------------------------  Past Medical History:  has a past medical history of Bone spur, DDD (degenerative disc disease), lumbar, Pre-diabetes, and Thyroid disease. Past Surgical History:  has a past surgical history that includes Tonsillectomy; Hysterectomy; other surgical history (Left, 7/31/2015); and knee surgery. Social History:  reports that she has never smoked. She has never used smokeless tobacco. She reports that she does not drink alcohol and does not use drugs. Family History: family history includes Arthritis in an other family member; Depression in an other family member; Diabetes in her father and another family member; Heart Disease in her mother and another family member; Hypertension in an other family member; Stroke in an other family member. The patients home medications have been reviewed. Allergies: Other and Iodine    -------------------------------------------------- RESULTS -------------------------------------------------  Labs:  No results found for this visit on 01/03/22. Radiology:  No orders to display       ------------------------- NURSING NOTES AND VITALS REVIEWED ---------------------------  Date / Time Roomed:  1/3/2022  9:26 AM  ED Bed Assignment:  02/02    The nursing notes within the ED encounter and vital signs as below have been reviewed.    BP (!) 152/105   Pulse 99   Temp 96.2 °F (35.7 °C) (Temporal)   Resp 18   Wt (!) 396 lb (179.6 kg)   SpO2 95%   BMI 56.82 kg/m²   Oxygen Saturation Interpretation: Normal      ------------------------------------------ PROGRESS NOTES ------------------------------------------  I have spoken with the patient and discussed todays results, in addition to providing specific details for the plan of care and counseling regarding the diagnosis and prognosis. Their questions are answered at this time and they are agreeable with the plan. I discussed at length with them reasons for immediate return here for re evaluation. They will followup with primary care by calling their office tomorrow. PCR COVID TEST DONE AS A SEND OUT      --------------------------------- ADDITIONAL PROVIDER NOTES ---------------------------------  At this time the patient is without objective evidence of an acute process requiring hospitalization or inpatient management. They have remained hemodynamically stable throughout their entire ED visit and are stable for discharge with outpatient follow-up. The plan has been discussed in detail and they are aware of the specific conditions for emergent return, as well as the importance of follow-up. New Prescriptions    BROMPHENIRAMINE-PSEUDOEPHEDRINE-DM 2-30-10 MG/5ML SYRUP    Take 5 mLs by mouth 4 times daily as needed for Congestion or Cough       Diagnosis:  1. Suspected COVID-19 virus infection        Disposition:  Patient's disposition: Discharge to home  Patient's condition is stable.                       Cortney Dawson MD  01/03/22 1708 [facility] Gregoria Posey MD  01/03/22 3049

## 2022-01-04 LAB — SARS-COV-2, PCR: DETECTED

## 2022-03-01 ENCOUNTER — HOSPITAL ENCOUNTER (OUTPATIENT)
Dept: MRI IMAGING | Age: 42
Discharge: HOME | End: 2022-03-03
Payer: MEDICAID

## 2022-03-01 DIAGNOSIS — M25.561 RIGHT KNEE PAIN, UNSPECIFIED CHRONICITY: ICD-10-CM

## 2022-03-01 PROCEDURE — 73721 MRI JNT OF LWR EXTRE W/O DYE: CPT

## 2022-09-20 ENCOUNTER — APPOINTMENT (OUTPATIENT)
Dept: GENERAL RADIOLOGY | Age: 42
End: 2022-09-20
Payer: MEDICAID

## 2022-09-20 ENCOUNTER — HOSPITAL ENCOUNTER (EMERGENCY)
Age: 42
Discharge: HOME | End: 2022-09-20
Attending: FAMILY MEDICINE
Payer: MEDICAID

## 2022-09-20 VITALS
WEIGHT: 293 LBS | SYSTOLIC BLOOD PRESSURE: 134 MMHG | DIASTOLIC BLOOD PRESSURE: 94 MMHG | HEART RATE: 100 BPM | OXYGEN SATURATION: 99 % | TEMPERATURE: 97 F | BODY MASS INDEX: 43.4 KG/M2 | HEIGHT: 69 IN | RESPIRATION RATE: 24 BRPM

## 2022-09-20 DIAGNOSIS — M72.2 PLANTAR FASCIITIS OF RIGHT FOOT: Primary | ICD-10-CM

## 2022-09-20 PROCEDURE — 99283 EMERGENCY DEPT VISIT LOW MDM: CPT

## 2022-09-20 PROCEDURE — 73630 X-RAY EXAM OF FOOT: CPT

## 2022-09-20 RX ORDER — PREDNISONE 20 MG/1
40 TABLET ORAL DAILY
Qty: 8 TABLET | Refills: 0 | Status: SHIPPED | OUTPATIENT
Start: 2022-09-20 | End: 2022-09-24

## 2022-09-20 RX ORDER — NAPROXEN 500 MG/1
500 TABLET ORAL 2 TIMES DAILY
Qty: 20 TABLET | Refills: 0 | Status: SHIPPED | OUTPATIENT
Start: 2022-09-20 | End: 2022-09-30

## 2022-09-20 ASSESSMENT — PAIN DESCRIPTION - LOCATION: LOCATION: FOOT

## 2022-09-20 ASSESSMENT — PAIN DESCRIPTION - ORIENTATION: ORIENTATION: RIGHT

## 2022-09-20 ASSESSMENT — PAIN DESCRIPTION - ONSET: ONSET: ON-GOING

## 2022-09-20 ASSESSMENT — PAIN DESCRIPTION - DESCRIPTORS: DESCRIPTORS: PATIENT UNABLE TO DESCRIBE;SHARP

## 2022-09-20 ASSESSMENT — PAIN SCALES - GENERAL: PAINLEVEL_OUTOF10: 8

## 2022-09-20 ASSESSMENT — PAIN DESCRIPTION - FREQUENCY: FREQUENCY: CONTINUOUS

## 2022-09-20 ASSESSMENT — PAIN - FUNCTIONAL ASSESSMENT: PAIN_FUNCTIONAL_ASSESSMENT: 0-10

## 2022-09-20 ASSESSMENT — PAIN DESCRIPTION - PAIN TYPE: TYPE: ACUTE PAIN

## 2022-09-21 NOTE — ED PROVIDER NOTES
HPI:  9/20/22,   Time: 8:17 PM EDT         Barry Garduno is a 43 y.o. female presenting to the ED for a 2-day history of cute onset of right heel pain. It hurts worse upon arising and after sitting for a while. It is a sharp pain of moderate to moderate severe intensity that eases up somewhat after walking for a while. Has been doing some exercise by walking about 3 times a day. She has new tennis shoes that she is using. ROS:   Pertinent positives and negatives are stated within HPI, all other systems reviewed and are negative.  --------------------------------------------- PAST HISTORY ---------------------------------------------  Past Medical History:  has a past medical history of Bone spur, DDD (degenerative disc disease), lumbar, Pre-diabetes, and Thyroid disease. Past Surgical History:  has a past surgical history that includes Tonsillectomy; Hysterectomy; other surgical history (Left, 7/31/2015); and knee surgery. Social History:  reports that she has never smoked. She has never used smokeless tobacco. She reports that she does not drink alcohol and does not use drugs. Family History: family history includes Arthritis in an other family member; Depression in an other family member; Diabetes in her father and another family member; Heart Disease in her mother and another family member; Hypertension in an other family member; Stroke in an other family member. The patients home medications have been reviewed. Allergies: Other and Iodine    -------------------------------------------------- RESULTS -------------------------------------------------  All laboratory and radiology results have been personally reviewed by myself   LABS:  No results found for this visit on 09/20/22. RADIOLOGY:  Interpreted by Radiologist.  XR FOOT RIGHT (MIN 3 VIEWS)   Final Result   No acute osseous abnormality. Calcaneal spur.              ------------------------- NURSING NOTES AND VITALS REVIEWED ---------------------------   The nursing notes within the ED encounter and vital signs as below have been reviewed. BP (!) 134/94   Pulse 100   Temp 97 °F (36.1 °C) (Temporal)   Resp 24   Ht 5' 9\" (1.753 m)   Wt (!) 380 lb (172.4 kg)   SpO2 99%   BMI 56.12 kg/m²   Oxygen Saturation Interpretation: Normal      ---------------------------------------------------PHYSICAL EXAM--------------------------------------    Constitutional/General: Alert and oriented x3, well appearing, non toxic in NAD  Head: NC/AT  Eyes: PERRL, EOMI  Mouth: Oropharynx clear, handling secretions, no trismus  Neck: Supple, full ROM, no meningeal signs  Pulmonary: Lungs clear to auscultation bilaterally, no wheezes, rales, or rhonchi. Not in respiratory distress  Cardiovascular:  Regular rate and rhythm, no murmurs, gallops, or rubs. 2+ distal pulses  Abdomen: Soft, non tender, non distended,   Extremities: Moves all extremities x 4. Warm and well perfused;  Right foot:  There is severe tenderness palpation in the end to the medial aspect of the calcaneus. Skin: warm and dry without rash  Neurologic: GCS 15,  Psych: Normal Affect      ------------------------------ ED COURSE/MEDICAL DECISION MAKING----------------------  Medications - No data to display      Medical Decision Making:    Symptomology and physical exam consistent with plantar fasciitis. Exercises were demonstrated. Anti-inflammatory medication was recommended. Follow-up with podiatry as recommended. Good heel and arch support over-the-counter inserts were recommended. Wear tennis shoes or shoes at home in the house, do not go barefoot. Counseling: The emergency provider has spoken with the patient and discussed todays results, in addition to providing specific details for the plan of care and counseling regarding the diagnosis and prognosis.   Questions are answered at this time and they are agreeable with the plan.      --------------------------------- IMPRESSION AND DISPOSITION ---------------------------------    IMPRESSION  1.  Plantar fasciitis of right foot        DISPOSITION  Disposition: Discharge to home  Patient condition is stable                 Giselle Padron MD  09/20/22 2020

## 2022-11-15 ENCOUNTER — HOSPITAL ENCOUNTER (EMERGENCY)
Age: 42
Discharge: HOME | End: 2022-11-15
Attending: STUDENT IN AN ORGANIZED HEALTH CARE EDUCATION/TRAINING PROGRAM
Payer: MEDICAID

## 2022-11-15 ENCOUNTER — APPOINTMENT (OUTPATIENT)
Dept: GENERAL RADIOLOGY | Age: 42
End: 2022-11-15
Payer: MEDICAID

## 2022-11-15 VITALS
HEIGHT: 69 IN | BODY MASS INDEX: 43.4 KG/M2 | TEMPERATURE: 97.4 F | DIASTOLIC BLOOD PRESSURE: 107 MMHG | HEART RATE: 90 BPM | WEIGHT: 293 LBS | RESPIRATION RATE: 16 BRPM | OXYGEN SATURATION: 98 % | SYSTOLIC BLOOD PRESSURE: 168 MMHG

## 2022-11-15 DIAGNOSIS — J40 BRONCHITIS: Primary | ICD-10-CM

## 2022-11-15 PROCEDURE — 99283 EMERGENCY DEPT VISIT LOW MDM: CPT

## 2022-11-15 PROCEDURE — 71046 X-RAY EXAM CHEST 2 VIEWS: CPT

## 2022-11-15 PROCEDURE — 6360000002 HC RX W HCPCS: Performed by: STUDENT IN AN ORGANIZED HEALTH CARE EDUCATION/TRAINING PROGRAM

## 2022-11-15 RX ORDER — DEXAMETHASONE SODIUM PHOSPHATE 10 MG/ML
10 INJECTION, SOLUTION INTRAMUSCULAR; INTRAVENOUS ONCE
Status: COMPLETED | OUTPATIENT
Start: 2022-11-15 | End: 2022-11-15

## 2022-11-15 RX ORDER — BUDESONIDE AND FORMOTEROL FUMARATE DIHYDRATE 160; 4.5 UG/1; UG/1
2 AEROSOL RESPIRATORY (INHALATION) 2 TIMES DAILY
Qty: 10.2 G | Refills: 0 | Status: SHIPPED | OUTPATIENT
Start: 2022-11-15

## 2022-11-15 RX ADMIN — DEXAMETHASONE SODIUM PHOSPHATE 10 MG: 10 INJECTION INTRAMUSCULAR; INTRAVENOUS at 10:52

## 2022-11-15 ASSESSMENT — ENCOUNTER SYMPTOMS
NAUSEA: 0
DIARRHEA: 0
SHORTNESS OF BREATH: 0
ABDOMINAL PAIN: 0
BACK PAIN: 0
VOMITING: 0
COLOR CHANGE: 0
COUGH: 1

## 2022-11-15 ASSESSMENT — PAIN - FUNCTIONAL ASSESSMENT: PAIN_FUNCTIONAL_ASSESSMENT: NONE - DENIES PAIN

## 2022-11-15 NOTE — ED PROVIDER NOTES
HPI   77-year-old female patient present to emergency department for evaluation cough clear cough ongoing for last 2 weeks. Worse at night. She has tried Mucinex and NyQuil with no improvement. No night sweats, no fevers, no chills, no weight loss. No congestion no body aches. No nausea or vomiting and no diarrhea. No chest pain or shortness of breath. No past history of blood clots. No hemoptysis. Patient's cough persistent. No history of asthma. She is diabetic she is on metformin. Review of Systems   Constitutional:  Negative for chills and fever. HENT:  Negative for congestion. Respiratory:  Positive for cough. Negative for shortness of breath. Cardiovascular:  Negative for chest pain. Gastrointestinal:  Negative for abdominal pain, diarrhea, nausea and vomiting. Genitourinary:  Negative for difficulty urinating, dysuria and hematuria. Musculoskeletal:  Negative for back pain and myalgias. Skin:  Negative for color change. All other systems reviewed and are negative. Physical Exam  Vitals and nursing note reviewed. Constitutional:       General: She is not in acute distress. Appearance: Normal appearance. She is obese. She is not ill-appearing. HENT:      Head: Normocephalic and atraumatic. Nose: Nose normal. No congestion. Mouth/Throat:      Mouth: Mucous membranes are moist.      Pharynx: Oropharynx is clear. Eyes:      Conjunctiva/sclera: Conjunctivae normal.      Pupils: Pupils are equal, round, and reactive to light. Cardiovascular:      Rate and Rhythm: Normal rate and regular rhythm. Pulses: Normal pulses. Heart sounds: Normal heart sounds. Pulmonary:      Effort: Pulmonary effort is normal. No respiratory distress. Breath sounds: Normal breath sounds. Abdominal:      General: Bowel sounds are normal. There is no distension. Tenderness: There is no abdominal tenderness.    Musculoskeletal:         General: Normal range of motion. Cervical back: Normal range of motion and neck supple. Skin:     General: Skin is warm and dry. Capillary Refill: Capillary refill takes less than 2 seconds. Neurological:      General: No focal deficit present. Mental Status: She is alert. Procedures     MDM  Patient here for evaluation of cough x2 weeks. No fevers, no other worrisome symptoms, no weight loss. Lung exam clear. Otherwise in no distress appearing comfortable. Chest x-ray obtained found to be negative. At this time we will treat symptomatically we will give 1 dose of Decadron as she is diabetic and give Symbicort as well as Bromfed. She is to follow-up with primary care physician. ED Course as of 11/15/22 1046   Tue Nov 15, 2022   1043 Patient is status post hysterectomy. [FG]      ED Course User Index  [FG] Jose A Briones DO       ED Course as of 11/15/22 1046   Tue Nov 15, 2022   1043 Patient is status post hysterectomy. [FG]      ED Course User Index  [FG] Jose A Briones DO       --------------------------------------------- PAST HISTORY ---------------------------------------------  Past Medical History:  has a past medical history of Bone spur, DDD (degenerative disc disease), lumbar, Pre-diabetes, and Thyroid disease. Past Surgical History:  has a past surgical history that includes Tonsillectomy; Hysterectomy; other surgical history (Left, 7/31/2015); and knee surgery. Social History:  reports that she has never smoked. She has never used smokeless tobacco. She reports that she does not drink alcohol and does not use drugs. Family History: family history includes Arthritis in an other family member; Depression in an other family member; Diabetes in her father and another family member; Heart Disease in her mother and another family member; Hypertension in an other family member; Stroke in an other family member. The patients home medications have been reviewed. Allergies:  Other and Iodine    -------------------------------------------------- RESULTS -------------------------------------------------  Labs:  No results found for this visit on 11/15/22. Radiology:  XR CHEST (2 VW)   Final Result   No acute process. ------------------------- NURSING NOTES AND VITALS REVIEWED ---------------------------  Date / Time Roomed:  11/15/2022  9:06 AM  ED Bed Assignment:  04/04    The nursing notes within the ED encounter and vital signs as below have been reviewed. BP (!) 168/107   Pulse 90   Temp 97.4 °F (36.3 °C) (Temporal)   Resp 16   Ht 5' 9\" (1.753 m)   Wt (!) 385 lb (174.6 kg)   SpO2 98%   BMI 56.85 kg/m²   Oxygen Saturation Interpretation: Normal      --------------------------------- ADDITIONAL PROVIDER NOTES ---------------------------------  At this time the patient is without objective evidence of an acute process requiring hospitalization or inpatient management. They have remained hemodynamically stable throughout their entire ED visit and are stable for discharge with outpatient follow-up. The plan has been discussed in detail and they are aware of the specific conditions for emergent return, as well as the importance of follow-up. New Prescriptions    BROMPHENIRAMINE-PSEUDOEPHEDRINE 1-15 MG/5ML ELIX ELIXIR    Take 5 mLs by mouth every 6 hours as needed (cough)    BUDESONIDE-FORMOTEROL (SYMBICORT) 160-4.5 MCG/ACT AERO    Inhale 2 puffs into the lungs 2 times daily       Diagnosis:  1. Bronchitis        Disposition:  Patient's disposition: Discharge to home  Patient's condition is stable.        Karley Jones,   Resident  11/15/22 1117

## 2023-02-03 ENCOUNTER — TELEPHONE (OUTPATIENT)
Dept: BARIATRICS/WEIGHT MGMT | Age: 43
End: 2023-02-03

## 2023-02-03 NOTE — TELEPHONE ENCOUNTER
A first call was made in an attempt to reach this patient for a referral we received. Number was disconnected.

## 2023-02-26 ENCOUNTER — HOSPITAL ENCOUNTER (OUTPATIENT)
Age: 43
Discharge: HOME | End: 2023-02-26
Payer: MEDICAID

## 2023-02-26 LAB
ALBUMIN SERPL-MCNC: 4.1 G/DL (ref 3.5–5.2)
ALP BLD-CCNC: 120 U/L (ref 35–104)
ALT SERPL-CCNC: 17 U/L (ref 0–32)
ANION GAP SERPL CALCULATED.3IONS-SCNC: 13 MMOL/L (ref 7–16)
AST SERPL-CCNC: 14 U/L (ref 0–31)
BASOPHILS ABSOLUTE: 0.06 E9/L (ref 0–0.2)
BASOPHILS RELATIVE PERCENT: 0.5 % (ref 0–2)
BILIRUB SERPL-MCNC: 0.3 MG/DL (ref 0–1.2)
BILIRUBIN DIRECT: <0.2 MG/DL (ref 0–0.3)
BILIRUBIN, INDIRECT: ABNORMAL MG/DL (ref 0–1)
BUN BLDV-MCNC: 12 MG/DL (ref 6–20)
CALCIUM SERPL-MCNC: 9.6 MG/DL (ref 8.6–10.2)
CHLORIDE BLD-SCNC: 95 MMOL/L (ref 98–107)
CHOLESTEROL, TOTAL: 279 MG/DL (ref 0–199)
CO2: 29 MMOL/L (ref 22–29)
CREAT SERPL-MCNC: 0.7 MG/DL (ref 0.5–1)
EOSINOPHILS ABSOLUTE: 0.16 E9/L (ref 0.05–0.5)
EOSINOPHILS RELATIVE PERCENT: 1.3 % (ref 0–6)
GFR SERPL CREATININE-BSD FRML MDRD: >60 ML/MIN/1.73
GLUCOSE BLD-MCNC: 310 MG/DL (ref 74–99)
HCT VFR BLD CALC: 42.6 % (ref 34–48)
HDLC SERPL-MCNC: 55 MG/DL
HEMOGLOBIN: 14 G/DL (ref 11.5–15.5)
IMMATURE GRANULOCYTES #: 0.03 E9/L
IMMATURE GRANULOCYTES %: 0.2 % (ref 0–5)
IRON: 80 MCG/DL (ref 37–145)
LDL CHOLESTEROL CALCULATED: 152 MG/DL (ref 0–99)
LYMPHOCYTES ABSOLUTE: 4.81 E9/L (ref 1.5–4)
LYMPHOCYTES RELATIVE PERCENT: 39.1 % (ref 20–42)
MCH RBC QN AUTO: 29.1 PG (ref 26–35)
MCHC RBC AUTO-ENTMCNC: 32.9 % (ref 32–34.5)
MCV RBC AUTO: 88.6 FL (ref 80–99.9)
MONOCYTES ABSOLUTE: 0.57 E9/L (ref 0.1–0.95)
MONOCYTES RELATIVE PERCENT: 4.6 % (ref 2–12)
NEUTROPHILS ABSOLUTE: 6.66 E9/L (ref 1.8–7.3)
NEUTROPHILS RELATIVE PERCENT: 54.3 % (ref 43–80)
PDW BLD-RTO: 13.2 FL (ref 11.5–15)
PLATELET # BLD: 284 E9/L (ref 130–450)
PMV BLD AUTO: 10.7 FL (ref 7–12)
POTASSIUM SERPL-SCNC: 3.6 MMOL/L (ref 3.5–5)
RBC # BLD: 4.81 E12/L (ref 3.5–5.5)
SEDIMENTATION RATE, ERYTHROCYTE: 29 MM/HR (ref 0–20)
SODIUM BLD-SCNC: 137 MMOL/L (ref 132–146)
T4 FREE: 0.9 NG/DL (ref 0.93–1.7)
TOTAL PROTEIN: 7.6 G/DL (ref 6.4–8.3)
TRIGL SERPL-MCNC: 362 MG/DL (ref 0–149)
TSH SERPL DL<=0.05 MIU/L-ACNC: 0.86 UIU/ML (ref 0.27–4.2)
URIC ACID, SERUM: 6.4 MG/DL (ref 2.4–5.7)
VITAMIN D 25-HYDROXY: 11 NG/ML (ref 30–100)
VLDLC SERPL CALC-MCNC: 72 MG/DL
WBC # BLD: 12.3 E9/L (ref 4.5–11.5)

## 2023-02-26 PROCEDURE — 84443 ASSAY THYROID STIM HORMONE: CPT

## 2023-02-26 PROCEDURE — 80061 LIPID PANEL: CPT

## 2023-02-26 PROCEDURE — 85025 COMPLETE CBC W/AUTO DIFF WBC: CPT

## 2023-02-26 PROCEDURE — 84439 ASSAY OF FREE THYROXINE: CPT

## 2023-02-26 PROCEDURE — 82306 VITAMIN D 25 HYDROXY: CPT

## 2023-02-26 PROCEDURE — 83540 ASSAY OF IRON: CPT

## 2023-02-26 PROCEDURE — 36415 COLL VENOUS BLD VENIPUNCTURE: CPT

## 2023-02-26 PROCEDURE — 84550 ASSAY OF BLOOD/URIC ACID: CPT

## 2023-02-26 PROCEDURE — 85651 RBC SED RATE NONAUTOMATED: CPT

## 2023-02-26 PROCEDURE — 82248 BILIRUBIN DIRECT: CPT

## 2023-02-26 PROCEDURE — 80053 COMPREHEN METABOLIC PANEL: CPT

## 2023-07-18 ENCOUNTER — OFFICE VISIT (OUTPATIENT)
Dept: BARIATRICS/WEIGHT MGMT | Age: 43
End: 2023-07-18
Payer: MEDICAID

## 2023-07-18 VITALS
HEART RATE: 95 BPM | SYSTOLIC BLOOD PRESSURE: 136 MMHG | BODY MASS INDEX: 43.4 KG/M2 | TEMPERATURE: 98.1 F | DIASTOLIC BLOOD PRESSURE: 71 MMHG | WEIGHT: 293 LBS | HEIGHT: 69 IN

## 2023-07-18 DIAGNOSIS — E66.01 CLASS 3 SEVERE OBESITY DUE TO EXCESS CALORIES WITH SERIOUS COMORBIDITY AND BODY MASS INDEX (BMI) OF 60.0 TO 69.9 IN ADULT (HCC): ICD-10-CM

## 2023-07-18 DIAGNOSIS — I10 ESSENTIAL HYPERTENSION: Primary | ICD-10-CM

## 2023-07-18 PROCEDURE — 1036F TOBACCO NON-USER: CPT | Performed by: INTERNAL MEDICINE

## 2023-07-18 PROCEDURE — 99204 OFFICE O/P NEW MOD 45 MIN: CPT | Performed by: INTERNAL MEDICINE

## 2023-07-18 PROCEDURE — 3075F SYST BP GE 130 - 139MM HG: CPT | Performed by: INTERNAL MEDICINE

## 2023-07-18 PROCEDURE — 99202 OFFICE O/P NEW SF 15 MIN: CPT

## 2023-07-18 PROCEDURE — G8417 CALC BMI ABV UP PARAM F/U: HCPCS | Performed by: INTERNAL MEDICINE

## 2023-07-18 PROCEDURE — 3078F DIAST BP <80 MM HG: CPT | Performed by: INTERNAL MEDICINE

## 2023-07-18 PROCEDURE — G8427 DOCREV CUR MEDS BY ELIG CLIN: HCPCS | Performed by: INTERNAL MEDICINE

## 2023-07-18 RX ORDER — LISINOPRIL AND HYDROCHLOROTHIAZIDE 12.5; 1 MG/1; MG/1
1 TABLET ORAL DAILY
COMMUNITY
Start: 2023-05-15

## 2023-07-18 RX ORDER — METFORMIN HYDROCHLORIDE 500 MG/1
500 TABLET, EXTENDED RELEASE ORAL EVERY 12 HOURS
COMMUNITY
Start: 2023-06-05

## 2023-07-18 RX ORDER — ATORVASTATIN CALCIUM 10 MG/1
TABLET, FILM COATED ORAL
COMMUNITY
Start: 2023-05-03

## 2023-07-18 RX ORDER — GLIPIZIDE 2.5 MG/1
2.5 TABLET, EXTENDED RELEASE ORAL DAILY
COMMUNITY
Start: 2023-06-06

## 2023-07-18 NOTE — PATIENT INSTRUCTIONS
Rules:  Count every calorie every day (you can use free julia such as 'baritastic' or 'nutritionix track')  Limit sweets to one day per month  Limit chips/crackers/pretzels/nuts/popcorn to 150 castillo/day  Eliminate all sugar sweetened beverages (including fruit juice)  Limit restaurants (including fast food and food from a convenience store) to one time every two weeks while in town    Requirements:  Make sure protein intake is at least 75 grams per day (do not count protein every day; instead spot check your intake every 2-3 weeks and make sure what you think you are getting is close to accurate; consider using a protein shake if needed; these are in the pharmacy section of the stores, not the grocery section; Premier, Pure Protein and Fairlife are relatively inexpensive and taste good to most patients; other options are Nectar, Boost Max, Ensure Max, BeneProtein and GNC lean (which is lactose-free); Nectar fruit, Premier Protein Clear, IsoPure Protein Drink, and Protein 2 O are water-based options; Quest (or Cosco, which is cheaper and is ordered on [employer]) and the Eventtus protein bars can also be used, but have less protein in them ) (<200 Castillo, >25 g protein) - (chicken, fish, turkey, egg white)  (Disclaimer: Dietary supplements rarely have their listed ingredients and the amount of each verified by a third party other. Sometimes they give verification for their claims to be GMO and gluten free and to be organic. However, even such verifications as these may still be untrustworthy.)  Make sure that fiber intake is at least 25 grams per day. Do this by either eating 12 tablespoons of the original, plain Fiber One cereal every day or 4 tablespoons of wheat dextrin powder (Benefiber or a generic brand) every day. Work up to this amount slowly by starting with only one-eighth to one-fourth of the target amount and then adding another one-eighth to one-fourth every one or two weeks until reaching the target.   Take one

## 2023-07-18 NOTE — PROGRESS NOTES
CC -   HLD, HTN, DM2, Obesity    BACKGROUND -   First visit: 7/18/23    Obesity (all weight in lbs)  Initial Ht 68.75\", Wt 405.2,  BMI 60.27  Began after having children, worse after coming from [place] in 2020  HS Grad wt 150-160  Lowest   wt 150   Highest  wt 405.2  Pattern of wt gain: gradual - up and down  Wt change past yr: same as now  Most wt lost: 40 lbs  Other diets attempted: small portions, was seeing weight loss doctor in [facility], was going to gym. Desire to lose weight: 10/10 (has thought about surgery)    Initial Diet:    Number of meals per day - 3    Number of snacks per day - 2-3    Meal volume - 12\" plate,  occasional seconds    Fast food/convenience store - 0x/week    Restaurants (not fast food) - 0x/week   Sweets - 7d/week (mary cakes)   Chips - 0d/week   Crackers/pretzels - 0d/week (eats cheese)   Nuts - 0d/week   Peanut Butter - 2d/week (PB sandwich)   Popcorn - 0d/week   Dried fruit - 0d/week   Whole fruit - 3-4d/week   Breakfast cereal - 0d/week   Granola/Protein/Energy bar - 7d/week (granola bar 100 Castillo)   Sugar sweetened beverages - 3 cans/wk pop/soda, no fruit juice, no coffee, no tea, no energy drinks   Protein - No supplements   Fiber - No supplements     Initial Exercise:    Gym membership - No. Has stationary bike at home - has not used it in about 2 months. Walking - no    Running - no    Resistance - has weights at home, but has not used it    Aerobic class - no     Sleep: about 5 hrs/night, trouble falling asleep, wakes up in between - 3-4x/night. Smt rested smt tired in am. No daytime naps (has 2 little kids).   ______________________    [facility] -  Past Medical History:   Diagnosis Date    Bone spur     left heel    DDD (degenerative disc disease), lumbar 10/9/2012    Pre-diabetes     Thyroid disease      Past Surgical History:   Procedure Laterality Date    HYSTERECTOMY (CERVIX STATUS UNKNOWN)      KNEE SURGERY      OTHER SURGICAL HISTORY Left 7/31/2015    EXCISION LEFT

## 2023-07-21 ENCOUNTER — TELEPHONE (OUTPATIENT)
Dept: BARIATRICS/WEIGHT MGMT | Age: 43
End: 2023-07-21

## 2023-08-02 ENCOUNTER — HOSPITAL ENCOUNTER (EMERGENCY)
Age: 43
Discharge: HOME | End: 2023-08-02
Attending: FAMILY MEDICINE
Payer: MEDICAID

## 2023-08-02 VITALS
DIASTOLIC BLOOD PRESSURE: 98 MMHG | RESPIRATION RATE: 16 BRPM | HEIGHT: 69 IN | TEMPERATURE: 98 F | HEART RATE: 100 BPM | SYSTOLIC BLOOD PRESSURE: 164 MMHG | BODY MASS INDEX: 43.4 KG/M2 | OXYGEN SATURATION: 99 % | WEIGHT: 293 LBS

## 2023-08-02 DIAGNOSIS — K08.89 PAIN, DENTAL: Primary | ICD-10-CM

## 2023-08-02 PROCEDURE — 99283 EMERGENCY DEPT VISIT LOW MDM: CPT

## 2023-08-02 RX ORDER — TRAMADOL HYDROCHLORIDE 50 MG/1
50 TABLET ORAL EVERY 8 HOURS PRN
Qty: 9 TABLET | Refills: 0 | Status: SHIPPED | OUTPATIENT
Start: 2023-08-02 | End: 2023-08-05

## 2023-08-02 RX ORDER — IBUPROFEN 800 MG/1
800 TABLET ORAL EVERY 8 HOURS PRN
Qty: 20 TABLET | Refills: 0 | Status: SHIPPED | OUTPATIENT
Start: 2023-08-02

## 2023-08-02 ASSESSMENT — PAIN DESCRIPTION - DESCRIPTORS: DESCRIPTORS: THROBBING

## 2023-08-02 ASSESSMENT — PAIN - FUNCTIONAL ASSESSMENT: PAIN_FUNCTIONAL_ASSESSMENT: 0-10

## 2023-08-02 ASSESSMENT — PAIN DESCRIPTION - LOCATION: LOCATION: JAW

## 2023-08-02 ASSESSMENT — PAIN DESCRIPTION - PAIN TYPE: TYPE: ACUTE PAIN

## 2023-08-02 ASSESSMENT — PAIN DESCRIPTION - ORIENTATION: ORIENTATION: LEFT;UPPER

## 2023-08-02 ASSESSMENT — PAIN SCALES - GENERAL: PAINLEVEL_OUTOF10: 10

## 2023-08-02 ASSESSMENT — PAIN DESCRIPTION - FREQUENCY: FREQUENCY: CONTINUOUS

## 2023-08-31 ENCOUNTER — OFFICE VISIT (OUTPATIENT)
Dept: BARIATRICS/WEIGHT MGMT | Age: 43
End: 2023-08-31
Payer: MEDICAID

## 2023-08-31 VITALS
HEART RATE: 100 BPM | WEIGHT: 293 LBS | DIASTOLIC BLOOD PRESSURE: 99 MMHG | RESPIRATION RATE: 20 BRPM | BODY MASS INDEX: 43.4 KG/M2 | SYSTOLIC BLOOD PRESSURE: 149 MMHG | HEIGHT: 69 IN | TEMPERATURE: 98 F

## 2023-08-31 DIAGNOSIS — E66.01 CLASS 3 SEVERE OBESITY DUE TO EXCESS CALORIES WITH SERIOUS COMORBIDITY AND BODY MASS INDEX (BMI) OF 60.0 TO 69.9 IN ADULT (HCC): ICD-10-CM

## 2023-08-31 DIAGNOSIS — I10 ESSENTIAL HYPERTENSION: Primary | ICD-10-CM

## 2023-08-31 DIAGNOSIS — E11.69 DIABETES MELLITUS TYPE 2 IN OBESE (HCC): ICD-10-CM

## 2023-08-31 DIAGNOSIS — E66.9 DIABETES MELLITUS TYPE 2 IN OBESE (HCC): ICD-10-CM

## 2023-08-31 PROCEDURE — 3077F SYST BP >= 140 MM HG: CPT | Performed by: INTERNAL MEDICINE

## 2023-08-31 PROCEDURE — 99214 OFFICE O/P EST MOD 30 MIN: CPT | Performed by: INTERNAL MEDICINE

## 2023-08-31 PROCEDURE — 1036F TOBACCO NON-USER: CPT | Performed by: INTERNAL MEDICINE

## 2023-08-31 PROCEDURE — 3080F DIAST BP >= 90 MM HG: CPT | Performed by: INTERNAL MEDICINE

## 2023-08-31 PROCEDURE — G8417 CALC BMI ABV UP PARAM F/U: HCPCS | Performed by: INTERNAL MEDICINE

## 2023-08-31 PROCEDURE — 3046F HEMOGLOBIN A1C LEVEL >9.0%: CPT | Performed by: INTERNAL MEDICINE

## 2023-08-31 PROCEDURE — G8427 DOCREV CUR MEDS BY ELIG CLIN: HCPCS | Performed by: INTERNAL MEDICINE

## 2023-08-31 PROCEDURE — 99211 OFF/OP EST MAY X REQ PHY/QHP: CPT

## 2023-08-31 PROCEDURE — 2022F DILAT RTA XM EVC RTNOPTHY: CPT | Performed by: INTERNAL MEDICINE

## 2023-08-31 RX ORDER — TIRZEPATIDE 2.5 MG/.5ML
2.5 INJECTION, SOLUTION SUBCUTANEOUS WEEKLY
Qty: 4 ADJUSTABLE DOSE PRE-FILLED PEN SYRINGE | Refills: 1 | Status: SHIPPED | OUTPATIENT
Start: 2023-08-31

## 2023-08-31 NOTE — PROGRESS NOTES
weight change to date: -7.6 lbs. Average daily energy variance:  2023 - 2023: -6.0 lbs (08872 Castillo)/43 d = -488 Castillo/d deficit. Patient's estimated daily energy need (DEN) = 2463 Castillo/d = 31485 Castillo/wk  Wt effect of HR foods Sweets, bars, PB, SSB = 1750 Castillo/wk = 250 Castillo/d= 10% DEN = 26 lb/year. Update:  Calorie monitorind/wk, usual intake ~1000 Castillo/d  Sweets: 0d/month  SSB: none  Restaurant/Fast food: 0x/wk  Appetite suppressant: None. But she feels she needs something. Home BP monitoring: Has been high recently (eg sbp 170 yesterday)  Protein: no supplement. (Has chicken, fish turkey etc)  Fiber: no supplement. Water: 5-6 bottles/day  Activities: walking 2 miles/day plus biking about 30-40 min. Contraception: had hysterectomy. Assessment  - improving, doing well with low calorie diet, but feels she needs an appetite suppressant. After going over options and adverse effects, we planned to start GLP-1RA for DM2 which can help with appetite suppression as well, and she will stop Glipizide when she starts Mounjaro. Plan   -   Rules:  Count every calorie every day  Limit sweets to one day per month  Limit chips/crackers/pretzels/nuts/popcorn to 150 castillo/day  Eliminate all sugar sweetened beverages (including fruit juice)  Limit restaurants (including fast food and food from a convenience store) to one time every two weeks while in town    Requirements:  Make sure protein intake is at least 75 grams per day  Make sure that fiber intake is at least 25 grams per day  Take one multivitamin every day    Targets:  Limit calorie intake to 1600 calories/day  Walk 30 minutes daily or equivalent (eg 50 min of stationary bike without resistance)  Avoid eating 2 hours within bedtime. List of low calorie non-starchy vegetables was provided. Mounjaro:  Start Mounjaro by taking 2.5 mg under the skin once a week. Follow up in 6-8 weeks.     Medication management: Aurea Hanson,

## 2023-08-31 NOTE — PATIENT INSTRUCTIONS
Rules:  Count every calorie every day (you can use free julia such as 'baritastic' or 'nutritionix track')  Limit sweets to one day per month  Limit chips/crackers/pretzels/nuts/popcorn to 150 christine/day  Eliminate all sugar sweetened beverages (including fruit juice)  Limit restaurants (including fast food and food from a convenience store) to one time every two weeks while in town    Requirements:  Make sure protein intake is at least 75 grams per day  Make sure that fiber intake is at least 25 grams per day  Take one multivitamin every day    Targets:  Limit calorie intake to 1600 calories/day  Walk 30 minutes daily or equivalent (eg 50 min of stationary bike without resistance)  Avoid eating 2 hours within bedtime. Low calorie non-starchy vegetables:  Food (per 100 g) Calories Fibers T. Carbohydrates Protein Fat Sodium (mg) Potassium (mg)   Green Bell Peppers 10 1.7 4.6 0.9 0.2 3 175   Cucumbers 15 0.5 3.6 0.7 0.1 2 147   Celery 16 1.6 3 0.7 0.2 80 260   Tomatoes 18 1.2 3.9 0.9 0.2 5 237   Carrots 41 2.8 10 0.9 0.2 69 320   Cabbage 25 2.5 6 1.3 0.1 18 170   Broccoli 35 3.3 7.2 2.4 0.4 41 293   Cauliflower 23 2.3 4.1 1.8 0.5 15 142   Iceberg Lettuce 14 1.2 3 0.9 0.1 10 141   Kale 28 2 5.6 1.9 0.4 23 228   Brussel Sprouts 43 3.8 9 3.4 0.3 25 389   Spinach 23 2.4 3.8 3 0.3 70 466   Zucchini 15 1 2.7 1.1 0.4 3 264   Mushroom 28 2.2 5.3 2.2 0.5 2 356   Asparagus 22 2 4.1 2.4 0.2 14 224   Green Beans 35 3.2 7.9 1.9 0.3 1 146   Eggplant 35 2.5 8.7 0.8 0.2 1 123       Mounjaro:  Start Mounjaro by taking 2.5 mg under the skin once a week. Follow up in 6-8 weeks.

## 2023-09-01 ENCOUNTER — HOSPITAL ENCOUNTER (EMERGENCY)
Age: 43
Discharge: HOME | End: 2023-09-01
Attending: EMERGENCY MEDICINE
Payer: MEDICAID

## 2023-09-01 VITALS
SYSTOLIC BLOOD PRESSURE: 145 MMHG | RESPIRATION RATE: 16 BRPM | DIASTOLIC BLOOD PRESSURE: 102 MMHG | HEART RATE: 96 BPM | OXYGEN SATURATION: 98 % | WEIGHT: 293 LBS | BODY MASS INDEX: 43.4 KG/M2 | TEMPERATURE: 98.2 F | HEIGHT: 69 IN

## 2023-09-01 DIAGNOSIS — S33.5XXA LUMBAR SPRAIN, INITIAL ENCOUNTER: Primary | ICD-10-CM

## 2023-09-01 PROCEDURE — 99284 EMERGENCY DEPT VISIT MOD MDM: CPT

## 2023-09-01 PROCEDURE — 96372 THER/PROPH/DIAG INJ SC/IM: CPT

## 2023-09-01 PROCEDURE — 6360000002 HC RX W HCPCS: Performed by: EMERGENCY MEDICINE

## 2023-09-01 RX ORDER — CYCLOBENZAPRINE HCL 10 MG
10 TABLET ORAL 3 TIMES DAILY PRN
Qty: 15 TABLET | Refills: 0 | Status: SHIPPED | OUTPATIENT
Start: 2023-09-01 | End: 2023-09-06

## 2023-09-01 RX ORDER — IBUPROFEN 600 MG/1
600 TABLET ORAL EVERY 8 HOURS PRN
Qty: 30 TABLET | Refills: 0 | Status: SHIPPED | OUTPATIENT
Start: 2023-09-01 | End: 2023-09-11

## 2023-09-01 RX ORDER — DEXAMETHASONE SODIUM PHOSPHATE 10 MG/ML
10 INJECTION, SOLUTION INTRAMUSCULAR; INTRAVENOUS ONCE
Status: COMPLETED | OUTPATIENT
Start: 2023-09-01 | End: 2023-09-01

## 2023-09-01 RX ORDER — KETOROLAC TROMETHAMINE 30 MG/ML
30 INJECTION, SOLUTION INTRAMUSCULAR; INTRAVENOUS ONCE
Status: COMPLETED | OUTPATIENT
Start: 2023-09-01 | End: 2023-09-01

## 2023-09-01 RX ADMIN — DEXAMETHASONE SODIUM PHOSPHATE 10 MG: 10 INJECTION, SOLUTION INTRAMUSCULAR; INTRAVENOUS at 09:37

## 2023-09-01 RX ADMIN — KETOROLAC TROMETHAMINE 30 MG: 30 INJECTION, SOLUTION INTRAMUSCULAR; INTRAVENOUS at 09:37

## 2023-09-01 ASSESSMENT — ENCOUNTER SYMPTOMS
NAUSEA: 0
ABDOMINAL PAIN: 0
BLOOD IN STOOL: 0
COUGH: 0
VOMITING: 0
SHORTNESS OF BREATH: 0
BACK PAIN: 1

## 2023-09-01 NOTE — ED PROVIDER NOTES
The history is provided by the patient. Back Pain  Location:  Lumbar spine  Quality:  Aching and stiffness  Radiates to:  Does not radiate  Pain severity:  Moderate  Onset quality:  Gradual  Duration:  1 week  Chronicity:  New  Associated symptoms: no abdominal pain, no chest pain, no dysuria, no fever, no headaches and no weakness       Review of Systems   Constitutional:  Negative for chills and fever. Respiratory:  Negative for cough and shortness of breath. Cardiovascular:  Negative for chest pain. Gastrointestinal:  Negative for abdominal pain, blood in stool, nausea and vomiting. Genitourinary:  Negative for dysuria and frequency. Musculoskeletal:  Positive for back pain. Skin:  Negative for rash. Neurological:  Negative for weakness and headaches. All other systems reviewed and are negative. Physical Exam  Vitals and nursing note reviewed. Constitutional:       Appearance: She is well-developed. HENT:      Head: Normocephalic and atraumatic. Right Ear: Hearing and external ear normal.      Left Ear: Hearing and external ear normal.      Nose: Nose normal.      Mouth/Throat:      Pharynx: Uvula midline. Eyes:      General: Lids are normal.      Conjunctiva/sclera: Conjunctivae normal.      Pupils: Pupils are equal, round, and reactive to light. Cardiovascular:      Rate and Rhythm: Normal rate and regular rhythm. Heart sounds: Normal heart sounds. No murmur heard. Pulmonary:      Effort: Pulmonary effort is normal. No respiratory distress. Breath sounds: Normal breath sounds. No wheezing or rales. Abdominal:      General: Bowel sounds are normal.      Palpations: Abdomen is soft. Abdomen is not rigid. Tenderness: There is no abdominal tenderness. There is no guarding or rebound. Musculoskeletal:      Cervical back: Normal range of motion and neck supple. Lumbar back: Spasms and tenderness present. No bony tenderness.         Back:    Skin:

## 2023-09-18 ENCOUNTER — TELEPHONE (OUTPATIENT)
Dept: BARIATRICS/WEIGHT MGMT | Age: 43
End: 2023-09-18

## 2023-09-18 DIAGNOSIS — E11.69 DIABETES MELLITUS TYPE 2 IN OBESE (HCC): Primary | ICD-10-CM

## 2023-09-18 DIAGNOSIS — E66.9 DIABETES MELLITUS TYPE 2 IN OBESE (HCC): Primary | ICD-10-CM

## 2023-09-18 RX ORDER — DULAGLUTIDE 0.75 MG/.5ML
0.75 INJECTION, SOLUTION SUBCUTANEOUS WEEKLY
Qty: 4 ADJUSTABLE DOSE PRE-FILLED PEN SYRINGE | Refills: 1 | Status: SHIPPED | OUTPATIENT
Start: 2023-09-18

## 2023-09-18 NOTE — TELEPHONE ENCOUNTER
Marina London was denied by insurance. We planned to start Trulicity, I put the order in after talking to her over the phone. Wilberto Vazquez MD  Internal Medicine/Obesity Medicine  9/18/2023.

## 2023-10-18 ENCOUNTER — OFFICE VISIT (OUTPATIENT)
Dept: BARIATRICS/WEIGHT MGMT | Age: 43
End: 2023-10-18
Payer: MEDICAID

## 2023-10-18 VITALS
TEMPERATURE: 96.3 F | BODY MASS INDEX: 43.4 KG/M2 | HEIGHT: 69 IN | DIASTOLIC BLOOD PRESSURE: 76 MMHG | HEART RATE: 96 BPM | WEIGHT: 293 LBS | SYSTOLIC BLOOD PRESSURE: 132 MMHG

## 2023-10-18 DIAGNOSIS — I10 ESSENTIAL HYPERTENSION: Primary | ICD-10-CM

## 2023-10-18 DIAGNOSIS — E11.69 DIABETES MELLITUS TYPE 2 IN OBESE (HCC): ICD-10-CM

## 2023-10-18 DIAGNOSIS — E66.01 CLASS 3 SEVERE OBESITY DUE TO EXCESS CALORIES WITH SERIOUS COMORBIDITY AND BODY MASS INDEX (BMI) OF 50.0 TO 59.9 IN ADULT (HCC): ICD-10-CM

## 2023-10-18 DIAGNOSIS — E66.9 DIABETES MELLITUS TYPE 2 IN OBESE (HCC): ICD-10-CM

## 2023-10-18 PROCEDURE — 1036F TOBACCO NON-USER: CPT | Performed by: INTERNAL MEDICINE

## 2023-10-18 PROCEDURE — 99211 OFF/OP EST MAY X REQ PHY/QHP: CPT | Performed by: INTERNAL MEDICINE

## 2023-10-18 PROCEDURE — 2022F DILAT RTA XM EVC RTNOPTHY: CPT | Performed by: INTERNAL MEDICINE

## 2023-10-18 PROCEDURE — G8428 CUR MEDS NOT DOCUMENT: HCPCS | Performed by: INTERNAL MEDICINE

## 2023-10-18 PROCEDURE — G8484 FLU IMMUNIZE NO ADMIN: HCPCS | Performed by: INTERNAL MEDICINE

## 2023-10-18 PROCEDURE — 3046F HEMOGLOBIN A1C LEVEL >9.0%: CPT | Performed by: INTERNAL MEDICINE

## 2023-10-18 PROCEDURE — 3074F SYST BP LT 130 MM HG: CPT | Performed by: INTERNAL MEDICINE

## 2023-10-18 PROCEDURE — G8417 CALC BMI ABV UP PARAM F/U: HCPCS | Performed by: INTERNAL MEDICINE

## 2023-10-18 PROCEDURE — 99214 OFFICE O/P EST MOD 30 MIN: CPT | Performed by: INTERNAL MEDICINE

## 2023-10-18 PROCEDURE — 3078F DIAST BP <80 MM HG: CPT | Performed by: INTERNAL MEDICINE

## 2023-10-18 RX ORDER — DULAGLUTIDE 1.5 MG/.5ML
1.5 INJECTION, SOLUTION SUBCUTANEOUS WEEKLY
Qty: 4 ADJUSTABLE DOSE PRE-FILLED PEN SYRINGE | Refills: 2 | Status: SHIPPED | OUTPATIENT
Start: 2023-10-18

## 2023-10-18 NOTE — PATIENT INSTRUCTIONS
Rules:  Count every calorie every day  Limit sweets to one day per month  Limit chips/crackers/pretzels/nuts/popcorn to 150 christine/day  Eliminate all sugar sweetened beverages (including fruit juice)  Limit restaurants (including fast food and food from a convenience store) to one time every two weeks while in town    Requirements:  Make sure protein intake is at least 75 grams per day  Make sure that fiber intake is at least 25 grams per day  Take one multivitamin every day    Targets:  Limit calorie intake to 1600 calories/day  Walk 30 minutes daily or equivalent (eg 50 min of stationary bike without resistance)  Avoid eating 2 hours within bedtime. Trulicity:  Increase Trulicity to 1.5 mg under the skin weekly. Follow up in 2 months.

## 2023-12-26 ENCOUNTER — HOSPITAL ENCOUNTER (EMERGENCY)
Age: 43
Discharge: HOME | End: 2023-12-26
Attending: FAMILY MEDICINE
Payer: MEDICAID

## 2023-12-26 VITALS
TEMPERATURE: 97.8 F | BODY MASS INDEX: 43.4 KG/M2 | RESPIRATION RATE: 20 BRPM | DIASTOLIC BLOOD PRESSURE: 80 MMHG | HEART RATE: 118 BPM | OXYGEN SATURATION: 97 % | SYSTOLIC BLOOD PRESSURE: 130 MMHG | WEIGHT: 293 LBS | HEIGHT: 69 IN

## 2023-12-26 DIAGNOSIS — J06.9 ACUTE UPPER RESPIRATORY INFECTION: Primary | ICD-10-CM

## 2023-12-26 PROCEDURE — 99283 EMERGENCY DEPT VISIT LOW MDM: CPT

## 2023-12-26 RX ORDER — ECHINACEA PURPUREA EXTRACT 125 MG
1 TABLET ORAL PRN
Qty: 88 ML | Refills: 0 | Status: SHIPPED | OUTPATIENT
Start: 2023-12-26

## 2023-12-26 RX ORDER — DEXTROMETHORPHAN HYDROBROMIDE AND PROMETHAZINE HYDROCHLORIDE 15; 6.25 MG/5ML; MG/5ML
5 SYRUP ORAL 4 TIMES DAILY PRN
Qty: 118 ML | Refills: 0 | Status: SHIPPED | OUTPATIENT
Start: 2023-12-26 | End: 2024-01-02

## 2023-12-26 RX ORDER — AZITHROMYCIN 250 MG/1
TABLET, FILM COATED ORAL
Qty: 1 PACKET | Refills: 0 | Status: SHIPPED | OUTPATIENT
Start: 2023-12-26 | End: 2023-12-30

## 2024-01-04 ENCOUNTER — OFFICE VISIT (OUTPATIENT)
Dept: BARIATRICS/WEIGHT MGMT | Age: 44
End: 2024-01-04
Payer: MEDICAID

## 2024-01-04 VITALS
WEIGHT: 293 LBS | DIASTOLIC BLOOD PRESSURE: 72 MMHG | BODY MASS INDEX: 43.4 KG/M2 | SYSTOLIC BLOOD PRESSURE: 114 MMHG | HEART RATE: 101 BPM | TEMPERATURE: 96.8 F | HEIGHT: 69 IN

## 2024-01-04 DIAGNOSIS — E66.01 CLASS 3 SEVERE OBESITY DUE TO EXCESS CALORIES WITH SERIOUS COMORBIDITY AND BODY MASS INDEX (BMI) OF 50.0 TO 59.9 IN ADULT (HCC): ICD-10-CM

## 2024-01-04 DIAGNOSIS — I10 ESSENTIAL HYPERTENSION: Primary | ICD-10-CM

## 2024-01-04 DIAGNOSIS — E11.69 DIABETES MELLITUS TYPE 2 IN OBESE (HCC): ICD-10-CM

## 2024-01-04 DIAGNOSIS — E66.9 DIABETES MELLITUS TYPE 2 IN OBESE (HCC): ICD-10-CM

## 2024-01-04 PROCEDURE — 3078F DIAST BP <80 MM HG: CPT | Performed by: INTERNAL MEDICINE

## 2024-01-04 PROCEDURE — G8484 FLU IMMUNIZE NO ADMIN: HCPCS | Performed by: INTERNAL MEDICINE

## 2024-01-04 PROCEDURE — G8428 CUR MEDS NOT DOCUMENT: HCPCS | Performed by: INTERNAL MEDICINE

## 2024-01-04 PROCEDURE — 99214 OFFICE O/P EST MOD 30 MIN: CPT | Performed by: INTERNAL MEDICINE

## 2024-01-04 PROCEDURE — G8417 CALC BMI ABV UP PARAM F/U: HCPCS | Performed by: INTERNAL MEDICINE

## 2024-01-04 PROCEDURE — 1036F TOBACCO NON-USER: CPT | Performed by: INTERNAL MEDICINE

## 2024-01-04 PROCEDURE — 3046F HEMOGLOBIN A1C LEVEL >9.0%: CPT | Performed by: INTERNAL MEDICINE

## 2024-01-04 PROCEDURE — 99211 OFF/OP EST MAY X REQ PHY/QHP: CPT | Performed by: INTERNAL MEDICINE

## 2024-01-04 PROCEDURE — 3074F SYST BP LT 130 MM HG: CPT | Performed by: INTERNAL MEDICINE

## 2024-01-04 PROCEDURE — 2022F DILAT RTA XM EVC RTNOPTHY: CPT | Performed by: INTERNAL MEDICINE

## 2024-01-04 RX ORDER — CHLORTHALIDONE 25 MG/1
25 TABLET ORAL DAILY
COMMUNITY
Start: 2023-10-18

## 2024-01-04 RX ORDER — DULAGLUTIDE 3 MG/.5ML
3 INJECTION, SOLUTION SUBCUTANEOUS WEEKLY
Qty: 4 ADJUSTABLE DOSE PRE-FILLED PEN SYRINGE | Refills: 3 | Status: SHIPPED | OUTPATIENT
Start: 2024-01-04

## 2024-01-04 RX ORDER — DICLOFENAC SODIUM 75 MG/1
75 TABLET, DELAYED RELEASE ORAL 2 TIMES DAILY
COMMUNITY
Start: 2023-12-05

## 2024-01-04 RX ORDER — LISINOPRIL 40 MG/1
40 TABLET ORAL DAILY
COMMUNITY
Start: 2023-10-18

## 2024-01-04 NOTE — PROGRESS NOTES
below    Problem 2  - Obesity   HPI   - See above Background for description    Weight  Date    405.2  23    397.6  23   Trulicity    395.2  10/18/23 Trulicity 1.5    385.8  24     Trulicity 3mg    Total weight change to date: -19.4 lbs.  Average daily energy variance:  2023 - 2023: -6.0 lbs (68946 Castillo)/43 d = -488 Castillo/d deficit.  2023 - 10/18/2023: -2.4 lbs (8400 Castillo)/48 d = -175 Castillo/d deficit.  10/18/2023 - 2024: -9.4 lbs (97736 Castillo)/78 d = -422 Castillo/d deficit.    Patient's estimated daily energy need (DEN) = 2463 Castillo/d = 34964 Castillo/wk  Wt effect of HR foods Sweets, bars, PB, SSB = 1750 Castillo/wk = 250 Castillo/d= 10% DEN = 26 lb/year.    Update:  Calorie monitorind/wk except for new year, usual intake ~1700 Castillo/d (1400 yesterday)  Sweets: 0d/month  SSB: none (diet pop)  Restaurant/Fast food: 0x/wk  Appetite suppressant: Trulicity 1.5 mg weekly, denies s/e, helps for the first 2 days for appetite suppression but not enough. Last hba1c 12->10.   Home BP monitoring: has been wnl  Protein: no supplement. (Has chicken, fish, turkey etc)  Fiber: keto bread  Water: 5-6 bottles/day  Activities: walking 2 miles/day (treadmill now) or biking about an hour  Contraception: had hysterectomy.      Assessment  - improving gradually, doing fairly well with low calorie diet. We started Trulicity for DM2 as well as appetite suppression (Mounjaro was not approved), it seems to be helping some but not enough, so we planned to increase the dose further to 3 mg.    Plan   -   Rules:  Count every calorie every day  Limit sweets to one day per month  Limit chips/crackers/pretzels/nuts/popcorn to 150 castillo/day  Eliminate all sugar sweetened beverages (including fruit juice)  Limit restaurants (including fast food and food from a convenience store) to one time every two weeks while in town    Requirements:  Make sure protein intake is at least 75 grams per day  Make sure that fiber intake is at least 25 grams per

## 2024-01-04 NOTE — PATIENT INSTRUCTIONS
Rules:  Count every calorie every day  Limit sweets to one day per month  Limit chips/crackers/pretzels/nuts/popcorn to 150 christine/day  Eliminate all sugar sweetened beverages (including fruit juice)  Limit restaurants (including fast food and food from a convenience store) to one time every two weeks while in town    Requirements:  Make sure protein intake is at least 75 grams per day  Make sure that fiber intake is at least 25 grams per day  Take one multivitamin every day    Targets:  Limit calorie intake to 1600 calories/day  Limit net carbohydrate intake to 120 grams/day  Walk 30 minutes daily or equivalent (eg 50 min of stationary bike without resistance)  Avoid eating 2 hours within bedtime.    Trulicity:  Increase Trulicity to 3 mg under the skin once a week.    Follow up in 3 months.

## 2024-02-26 ENCOUNTER — HOSPITAL ENCOUNTER (OUTPATIENT)
Age: 44
Discharge: HOME | End: 2024-02-26
Payer: MEDICAID

## 2024-02-26 LAB
25(OH)D3 SERPL-MCNC: 10 NG/ML (ref 30–100)
ALBUMIN SERPL-MCNC: 4 G/DL (ref 3.5–5.2)
ALP SERPL-CCNC: 98 U/L (ref 35–104)
ALT SERPL-CCNC: 10 U/L (ref 0–32)
ANION GAP SERPL CALCULATED.3IONS-SCNC: 15 MMOL/L (ref 7–16)
AST SERPL-CCNC: 10 U/L (ref 0–31)
BILIRUB DIRECT SERPL-MCNC: <0.2 MG/DL (ref 0–0.3)
BILIRUB SERPL-MCNC: 0.4 MG/DL (ref 0–1.2)
BUN SERPL-MCNC: 11 MG/DL (ref 6–20)
CALCIUM SERPL-MCNC: 9.8 MG/DL (ref 8.6–10.2)
CHLORIDE SERPL-SCNC: 101 MMOL/L (ref 98–107)
CHOLEST SERPL-MCNC: 199 MG/DL
CO2 SERPL-SCNC: 25 MMOL/L (ref 22–29)
CREAT SERPL-MCNC: 0.7 MG/DL (ref 0.5–1)
ERYTHROCYTE [SEDIMENTATION RATE] IN BLOOD BY WESTERGREN METHOD: 51 MM/HR (ref 0–20)
GFR SERPL CREATININE-BSD FRML MDRD: >60 ML/MIN/1.73M2
GLUCOSE SERPL-MCNC: 213 MG/DL (ref 74–99)
HDLC SERPL-MCNC: 47 MG/DL
IRON SERPL-MCNC: 105 UG/DL (ref 37–145)
LDLC SERPL CALC-MCNC: 122 MG/DL
POTASSIUM SERPL-SCNC: 4 MMOL/L (ref 3.5–5)
PROT SERPL-MCNC: 7.4 G/DL (ref 6.4–8.3)
SODIUM SERPL-SCNC: 141 MMOL/L (ref 132–146)
T4 FREE SERPL-MCNC: 0.9 NG/DL (ref 0.9–1.7)
TRIGL SERPL-MCNC: 149 MG/DL
TSH SERPL DL<=0.05 MIU/L-ACNC: 1.51 UIU/ML (ref 0.27–4.2)
URATE SERPL-MCNC: 6.9 MG/DL (ref 2.4–5.7)
VLDLC SERPL CALC-MCNC: 30 MG/DL

## 2024-02-26 PROCEDURE — 85652 RBC SED RATE AUTOMATED: CPT

## 2024-02-26 PROCEDURE — 84550 ASSAY OF BLOOD/URIC ACID: CPT

## 2024-02-26 PROCEDURE — 83540 ASSAY OF IRON: CPT

## 2024-02-26 PROCEDURE — 36415 COLL VENOUS BLD VENIPUNCTURE: CPT

## 2024-02-26 PROCEDURE — 80053 COMPREHEN METABOLIC PANEL: CPT

## 2024-02-26 PROCEDURE — 82248 BILIRUBIN DIRECT: CPT

## 2024-02-26 PROCEDURE — 80061 LIPID PANEL: CPT

## 2024-02-26 PROCEDURE — 82306 VITAMIN D 25 HYDROXY: CPT

## 2024-02-26 PROCEDURE — 84443 ASSAY THYROID STIM HORMONE: CPT

## 2024-02-26 PROCEDURE — 84439 ASSAY OF FREE THYROXINE: CPT

## 2024-04-08 ENCOUNTER — OFFICE VISIT (OUTPATIENT)
Dept: BARIATRICS/WEIGHT MGMT | Age: 44
End: 2024-04-08
Payer: MEDICAID

## 2024-04-08 VITALS
HEIGHT: 69 IN | BODY MASS INDEX: 43.4 KG/M2 | SYSTOLIC BLOOD PRESSURE: 142 MMHG | WEIGHT: 293 LBS | HEART RATE: 102 BPM | TEMPERATURE: 96.8 F | DIASTOLIC BLOOD PRESSURE: 90 MMHG

## 2024-04-08 DIAGNOSIS — E66.9 TYPE 2 DIABETES MELLITUS WITH OBESITY (HCC): ICD-10-CM

## 2024-04-08 DIAGNOSIS — I10 ESSENTIAL HYPERTENSION: Primary | ICD-10-CM

## 2024-04-08 DIAGNOSIS — E55.9 VITAMIN D DEFICIENCY: ICD-10-CM

## 2024-04-08 DIAGNOSIS — E11.69 TYPE 2 DIABETES MELLITUS WITH OBESITY (HCC): ICD-10-CM

## 2024-04-08 PROCEDURE — 3080F DIAST BP >= 90 MM HG: CPT | Performed by: INTERNAL MEDICINE

## 2024-04-08 PROCEDURE — 99211 OFF/OP EST MAY X REQ PHY/QHP: CPT

## 2024-04-08 PROCEDURE — G8417 CALC BMI ABV UP PARAM F/U: HCPCS | Performed by: INTERNAL MEDICINE

## 2024-04-08 PROCEDURE — G8428 CUR MEDS NOT DOCUMENT: HCPCS | Performed by: INTERNAL MEDICINE

## 2024-04-08 PROCEDURE — 1036F TOBACCO NON-USER: CPT | Performed by: INTERNAL MEDICINE

## 2024-04-08 PROCEDURE — 99214 OFFICE O/P EST MOD 30 MIN: CPT | Performed by: INTERNAL MEDICINE

## 2024-04-08 PROCEDURE — 3046F HEMOGLOBIN A1C LEVEL >9.0%: CPT | Performed by: INTERNAL MEDICINE

## 2024-04-08 PROCEDURE — 3077F SYST BP >= 140 MM HG: CPT | Performed by: INTERNAL MEDICINE

## 2024-04-08 PROCEDURE — 2022F DILAT RTA XM EVC RTNOPTHY: CPT | Performed by: INTERNAL MEDICINE

## 2024-04-08 RX ORDER — DULAGLUTIDE 4.5 MG/.5ML
4.5 INJECTION, SOLUTION SUBCUTANEOUS WEEKLY
Qty: 4 ADJUSTABLE DOSE PRE-FILLED PEN SYRINGE | Refills: 3 | Status: SHIPPED | OUTPATIENT
Start: 2024-04-08

## 2024-04-08 RX ORDER — ERGOCALCIFEROL 1.25 MG/1
50000 CAPSULE ORAL WEEKLY
Qty: 12 CAPSULE | Refills: 0 | Status: SHIPPED | OUTPATIENT
Start: 2024-04-08

## 2024-04-08 NOTE — PATIENT INSTRUCTIONS
Rules:  Count every calorie every day  Limit sweets to one day per month  Limit chips/crackers/pretzels/nuts/popcorn to 150 christine/day  Eliminate all sugar sweetened beverages (including fruit juice)  Limit restaurants (including fast food and food from a convenience store) to one time every two weeks while in town    Requirements:  Make sure protein intake is at least 75 grams per day  Make sure that fiber intake is at least 25 grams per day  Take one multivitamin every day    Targets:  Limit calorie intake to 1600 calories/day  Limit net carbohydrate intake to 120 grams/day  Walk 30 minutes daily or equivalent (eg 50 min of stationary bike without resistance)  Avoid eating 2 hours within bedtime.    Trulicity:  Increase Trulicity to 4.5 mg under the skin once a week.    Vitamin D 50,000 Units/week:  Start Vitamind D 50,000 units capsules by mouth once a week for 12 weeks. Please get vitamin D level checked after 3 months.    Follow up in 3 months.

## 2024-04-08 NOTE — PROGRESS NOTES
sugar sweetened beverages (including fruit juice)  Limit restaurants (including fast food and food from a convenience store) to one time every two weeks while in town    Requirements:  Make sure protein intake is at least 75 grams per day  Make sure that fiber intake is at least 25 grams per day  Take one multivitamin every day    Targets:  Limit calorie intake to 1600 calories/day  Limit net carbohydrate intake to 120 grams/day  Walk 30 minutes daily or equivalent (eg 50 min of stationary bike without resistance)  Avoid eating 2 hours within bedtime.    Trulicity:  Increase Trulicity to 4.5 mg under the skin once a week.    Vitamin D 50,000 Units/week:  Start Vitamind D 50,000 units capsules by mouth once a week for 12 weeks. Please get vitamin D level checked after 3 months.    Follow up in 3 months.    Medication management: Alyce Hanson MD  Internal Medicine/Obesity Medicine  4/8/2024.

## 2024-05-07 ENCOUNTER — TELEPHONE (OUTPATIENT)
Dept: BARIATRICS/WEIGHT MGMT | Age: 44
End: 2024-05-07

## 2024-05-12 ENCOUNTER — TELEPHONE (OUTPATIENT)
Dept: BARIATRICS/WEIGHT MGMT | Age: 44
End: 2024-05-12

## 2024-05-12 DIAGNOSIS — E66.9 TYPE 2 DIABETES MELLITUS WITH OBESITY (HCC): Primary | ICD-10-CM

## 2024-05-12 DIAGNOSIS — E11.69 TYPE 2 DIABETES MELLITUS WITH OBESITY (HCC): Primary | ICD-10-CM

## 2024-05-12 RX ORDER — SEMAGLUTIDE 1.34 MG/ML
1 INJECTION, SOLUTION SUBCUTANEOUS
Qty: 3 ADJUSTABLE DOSE PRE-FILLED PEN SYRINGE | Refills: 1 | Status: SHIPPED | OUTPATIENT
Start: 2024-05-12

## 2024-05-13 ENCOUNTER — TELEPHONE (OUTPATIENT)
Dept: BARIATRICS/WEIGHT MGMT | Age: 44
End: 2024-05-13

## 2024-05-13 NOTE — TELEPHONE ENCOUNTER
Ozempic Denied 24  Coverage is provided when the member meets all the followin. Member has had an inadequate clinical response (the inability to reach A1C goal (less than 7%) (a test result that shows a three-month average of blood sugars) after at least 120 days of current regimen, with use of two or more drugs concomitantly (at the same time) per ADA guidelines (American Diabetes Association) and, 2. Member has documented adherence (taking medications correctly) and appropriate dose escalation (must achieve maximum recommended dose or document that maximum recommended dose is not tolerated or is clinically inappropriate) and, 3. Documentation includes a patient specific A1C goal if less than 7% and must include current A1C (within the last 6 months). The [facility] Policy for Medical Necessity as posted on the [facility] website and [facility] Preferred Drug List criteria were reviewed and per [place] Administrative Code Rule 5160-1-01 (C) and  (B), a medically necessary service must include: generally accepted standards of medical practice, be clinically appropriate in administration, treatment and outcome and be the lowest cost alternative to effectively treat the condition. Please contact your provider to assist you with other treatment options that might be covered under your benefit package, or other services that might be available through the community.

## 2024-05-24 DIAGNOSIS — E66.9 TYPE 2 DIABETES MELLITUS WITH OBESITY (HCC): Primary | ICD-10-CM

## 2024-05-24 DIAGNOSIS — E11.69 TYPE 2 DIABETES MELLITUS WITH OBESITY (HCC): Primary | ICD-10-CM

## 2024-05-24 NOTE — PROGRESS NOTES
Ozempic was denied d/t new hba1c was not available, so I ordered hba1c (last one was 8.5).    Rodri Hanson MD  Internal Medicine/Obesity Medicine  5/24/2024.

## 2024-05-26 ENCOUNTER — HOSPITAL ENCOUNTER (OUTPATIENT)
Age: 44
Discharge: HOME | End: 2024-05-26
Payer: MEDICAID

## 2024-05-26 DIAGNOSIS — E11.69 TYPE 2 DIABETES MELLITUS WITH OBESITY (HCC): ICD-10-CM

## 2024-05-26 DIAGNOSIS — E66.9 TYPE 2 DIABETES MELLITUS WITH OBESITY (HCC): ICD-10-CM

## 2024-05-26 LAB
25(OH)D3 SERPL-MCNC: 22.8 NG/ML (ref 30–100)
HBA1C MFR BLD: 9 % (ref 4–5.6)

## 2024-05-26 PROCEDURE — 83036 HEMOGLOBIN GLYCOSYLATED A1C: CPT

## 2024-05-26 PROCEDURE — 82306 VITAMIN D 25 HYDROXY: CPT

## 2024-05-26 PROCEDURE — 36415 COLL VENOUS BLD VENIPUNCTURE: CPT

## 2024-05-28 DIAGNOSIS — E66.9 TYPE 2 DIABETES MELLITUS WITH OBESITY (HCC): Primary | ICD-10-CM

## 2024-05-28 DIAGNOSIS — E11.69 TYPE 2 DIABETES MELLITUS WITH OBESITY (HCC): Primary | ICD-10-CM

## 2024-05-28 RX ORDER — SEMAGLUTIDE 0.68 MG/ML
0.25 INJECTION, SOLUTION SUBCUTANEOUS WEEKLY
Qty: 3 ML | Refills: 1 | Status: SHIPPED | OUTPATIENT
Start: 2024-05-28

## 2024-05-28 NOTE — PROGRESS NOTES
Noted to have hba1c of 9.0, Ozempic reordered (previously was on Trulicity but that was not available).    Rodri Hanson MD  Internal Medicine/Obesity Medicine  5/28/2024.

## 2024-05-29 ENCOUNTER — TELEPHONE (OUTPATIENT)
Dept: BARIATRICS/WEIGHT MGMT | Age: 44
End: 2024-05-29

## 2024-05-29 NOTE — TELEPHONE ENCOUNTER
Prior authorization approved Ozempic  Case ID: [id number]      Payer: Auto Search Patient's Payer    [phone]   Your PA request for [id number] was approved for 365 days. The PA# assigned is [id number].   Approval Details    Authorized from May 29, 2024 to May 28, 2025

## 2024-07-16 ENCOUNTER — OFFICE VISIT (OUTPATIENT)
Dept: BARIATRICS/WEIGHT MGMT | Age: 44
End: 2024-07-16
Payer: MEDICAID

## 2024-07-16 VITALS
WEIGHT: 293 LBS | SYSTOLIC BLOOD PRESSURE: 144 MMHG | HEART RATE: 88 BPM | DIASTOLIC BLOOD PRESSURE: 80 MMHG | HEIGHT: 70 IN | BODY MASS INDEX: 41.95 KG/M2 | TEMPERATURE: 97.3 F

## 2024-07-16 DIAGNOSIS — E11.69 TYPE 2 DIABETES MELLITUS WITH OBESITY (HCC): ICD-10-CM

## 2024-07-16 DIAGNOSIS — I10 ESSENTIAL HYPERTENSION: Primary | ICD-10-CM

## 2024-07-16 DIAGNOSIS — E66.9 TYPE 2 DIABETES MELLITUS WITH OBESITY (HCC): ICD-10-CM

## 2024-07-16 DIAGNOSIS — E55.9 VITAMIN D DEFICIENCY: ICD-10-CM

## 2024-07-16 DIAGNOSIS — E66.01 CLASS 3 SEVERE OBESITY DUE TO EXCESS CALORIES WITH SERIOUS COMORBIDITY AND BODY MASS INDEX (BMI) OF 50.0 TO 59.9 IN ADULT (HCC): ICD-10-CM

## 2024-07-16 PROCEDURE — 3077F SYST BP >= 140 MM HG: CPT | Performed by: INTERNAL MEDICINE

## 2024-07-16 PROCEDURE — 2022F DILAT RTA XM EVC RTNOPTHY: CPT | Performed by: INTERNAL MEDICINE

## 2024-07-16 PROCEDURE — 1036F TOBACCO NON-USER: CPT | Performed by: INTERNAL MEDICINE

## 2024-07-16 PROCEDURE — G8417 CALC BMI ABV UP PARAM F/U: HCPCS | Performed by: INTERNAL MEDICINE

## 2024-07-16 PROCEDURE — G8428 CUR MEDS NOT DOCUMENT: HCPCS | Performed by: INTERNAL MEDICINE

## 2024-07-16 PROCEDURE — 3079F DIAST BP 80-89 MM HG: CPT | Performed by: INTERNAL MEDICINE

## 2024-07-16 PROCEDURE — 99214 OFFICE O/P EST MOD 30 MIN: CPT | Performed by: INTERNAL MEDICINE

## 2024-07-16 PROCEDURE — 3052F HG A1C>EQUAL 8.0%<EQUAL 9.0%: CPT | Performed by: INTERNAL MEDICINE

## 2024-07-16 PROCEDURE — 99211 OFF/OP EST MAY X REQ PHY/QHP: CPT | Performed by: INTERNAL MEDICINE

## 2024-07-16 RX ORDER — SEMAGLUTIDE 1.34 MG/ML
1 INJECTION, SOLUTION SUBCUTANEOUS
Qty: 1 ADJUSTABLE DOSE PRE-FILLED PEN SYRINGE | Refills: 3 | Status: SHIPPED | OUTPATIENT
Start: 2024-07-16

## 2024-07-16 RX ORDER — M-VIT,TX,IRON,MINS/CALC/FOLIC 27MG-0.4MG
1 TABLET ORAL DAILY
Qty: 90 TABLET | Refills: 1 | Status: SHIPPED | OUTPATIENT
Start: 2024-07-16

## 2024-07-16 NOTE — PATIENT INSTRUCTIONS
Rules:  Count every calorie every day  Limit sweets to one day per month  Limit chips/crackers/pretzels/nuts/popcorn to 150 christine/day  Eliminate all sugar sweetened beverages (including fruit juice)  Limit restaurants (including fast food and food from a convenience store) to one time every two weeks while in town    Requirements:  Make sure protein intake is at least 75 grams per day  Make sure that fiber intake is at least 25 grams per day  Take one multivitamin every day    Targets:  Limit calorie intake to 1600 calories/day  Limit net carbohydrate intake to 120 grams/day  Walk 30 minutes daily or equivalent (eg 50 min of stationary bike without resistance)  Avoid eating 2 hours within bedtime.    Ozempic:  Increase Ozempic to 1 mg under the skin weekly after completing current 0.5 mg weekly. Please get blood tests done prior to next follow up.    Follow up in 3 months.

## 2024-07-16 NOTE — PROGRESS NOTES
adjustment. Weight reduction can help de-escalate dose of anti-hypertensive. Weight reduction per plan below    Problem 2  - Obesity   HPI   - See above Background for description    Weight  Date    405.2  7/18/23    397.6  8/31/23   Trulicity    395.2  10/18/23 Trulicity 1.5    385.8  1/4/24     Trulicity 3mg    381.8  4/8/24    Trulicity 4.5 (vitamin D as well) -> Ozempic    372.6  7/16/24    Total weight change to date: -32.6 lbs.  Average daily energy variance:  7/18/2023 - 8/31/2023: -6.0 lbs (45739 Castillo)/43 d = -488 Castillo/d deficit.  8/31/2023 - 10/18/2023: -2.4 lbs (8400 Castillo)/48 d = -175 Castillo/d deficit.  10/18/2023 - 1/4/2024: -9.4 lbs (78061 Castillo)/78 d = -422 Castillo/d deficit.  1/4/2024 - 4/8/2024: -4.0 lbs (20916 Castillo)/95 d = -147 Castillo/d deficit.   4/8/2024 - 7/16/2024: -9.2 lbs (05433 Castillo)/99 d = -325 Castillo/d deficit.     Patient's estimated daily energy need (DEN) = 2463 Castillo/d = 74221 Castillo/wk  Wt effect of HR foods Sweets, bars, PB, SSB = 1750 Castillo/wk = 250 Castillo/d= 10% DEN = 26 lb/year.    Update:  Calorie monitoring: not as much as prior but does not feel hungry at all since starting Ozempic  Sweets: 0d/month  SSB: none (diet pop)  Restaurant/Fast food: 0x/wk  Appetite suppressant: Ozempic now 0.5 mg weekly, no s/e helps her with BGL and appetite, would like to go up on the dose.  Home BP monitoring: has been wnl  Protein: no supplement. (Has chicken, fish, turkey etc)  Fiber: keto buns, mixed vegetables  Water: 5-6 bottles/day  Activities: walking 2 miles/day (treadmill now and also at work) or biking about an hour  Contraception: had hysterectomy.      Assessment  - improving; would like to restart low calorie diet. We talked about macro nutrients, and targeted elimination. We planned to increase Ozempic for DM2. She will also get blood tests (ordered).    Plan   -   Rules:  Count every calorie every day  Limit sweets to one day per month  Limit chips/crackers/pretzels/nuts/popcorn to 150 castillo/day  Eliminate all sugar

## 2024-07-26 ENCOUNTER — TELEPHONE (OUTPATIENT)
Dept: BARIATRICS/WEIGHT MGMT | Age: 44
End: 2024-07-26

## 2024-07-26 NOTE — TELEPHONE ENCOUNTER
Prior authorization approved Ozempic approval Case ID: [id number]      Payer: Auto Search Patient's Payer    [phone]   Your PA request for [id number] was approved for 314 days. The PA# assigned is [id number].

## 2024-10-17 ENCOUNTER — HOSPITAL ENCOUNTER (OUTPATIENT)
Age: 44
Discharge: HOME | End: 2024-10-17
Payer: MEDICAID

## 2024-10-17 DIAGNOSIS — E66.9 TYPE 2 DIABETES MELLITUS WITH OBESITY (HCC): ICD-10-CM

## 2024-10-17 DIAGNOSIS — E55.9 VITAMIN D DEFICIENCY: ICD-10-CM

## 2024-10-17 DIAGNOSIS — E11.69 TYPE 2 DIABETES MELLITUS WITH OBESITY (HCC): ICD-10-CM

## 2024-10-17 LAB
25(OH)D3 SERPL-MCNC: 24.5 NG/ML (ref 30–100)
ALBUMIN SERPL-MCNC: 4.1 G/DL (ref 3.5–5.2)
ALP SERPL-CCNC: 86 U/L (ref 35–104)
ALT SERPL-CCNC: 12 U/L (ref 0–32)
ANION GAP SERPL CALCULATED.3IONS-SCNC: 11 MMOL/L (ref 7–16)
AST SERPL-CCNC: 14 U/L (ref 0–31)
BILIRUB SERPL-MCNC: 0.4 MG/DL (ref 0–1.2)
BUN SERPL-MCNC: 13 MG/DL (ref 6–20)
CALCIUM SERPL-MCNC: 9.6 MG/DL (ref 8.6–10.2)
CHLORIDE SERPL-SCNC: 100 MMOL/L (ref 98–107)
CHOLEST SERPL-MCNC: 201 MG/DL
CO2 SERPL-SCNC: 27 MMOL/L (ref 22–29)
CREAT SERPL-MCNC: 0.6 MG/DL (ref 0.5–1)
GFR, ESTIMATED: >90 ML/MIN/1.73M2
GLUCOSE SERPL-MCNC: 142 MG/DL (ref 74–99)
HBA1C MFR BLD: 8.1 % (ref 4–5.6)
HDLC SERPL-MCNC: 61 MG/DL
LDLC SERPL CALC-MCNC: 119 MG/DL
POTASSIUM SERPL-SCNC: 3.7 MMOL/L (ref 3.5–5)
PROT SERPL-MCNC: 7.4 G/DL (ref 6.4–8.3)
SODIUM SERPL-SCNC: 138 MMOL/L (ref 132–146)
TRIGL SERPL-MCNC: 107 MG/DL
VLDLC SERPL CALC-MCNC: 21 MG/DL

## 2024-10-17 PROCEDURE — 80053 COMPREHEN METABOLIC PANEL: CPT

## 2024-10-17 PROCEDURE — 36415 COLL VENOUS BLD VENIPUNCTURE: CPT

## 2024-10-17 PROCEDURE — 82306 VITAMIN D 25 HYDROXY: CPT

## 2024-10-17 PROCEDURE — 83036 HEMOGLOBIN GLYCOSYLATED A1C: CPT

## 2024-10-17 PROCEDURE — 80061 LIPID PANEL: CPT

## 2024-10-21 ENCOUNTER — TELEPHONE (OUTPATIENT)
Dept: BARIATRICS/WEIGHT MGMT | Age: 44
End: 2024-10-21

## 2024-10-21 ENCOUNTER — OFFICE VISIT (OUTPATIENT)
Dept: BARIATRICS/WEIGHT MGMT | Age: 44
End: 2024-10-21
Payer: MEDICAID

## 2024-10-21 VITALS
BODY MASS INDEX: 41.95 KG/M2 | TEMPERATURE: 96.8 F | DIASTOLIC BLOOD PRESSURE: 57 MMHG | SYSTOLIC BLOOD PRESSURE: 109 MMHG | HEART RATE: 103 BPM | WEIGHT: 293 LBS | HEIGHT: 70 IN

## 2024-10-21 DIAGNOSIS — E66.9 TYPE 2 DIABETES MELLITUS WITH OBESITY (HCC): ICD-10-CM

## 2024-10-21 DIAGNOSIS — E66.01 CLASS 3 SEVERE OBESITY DUE TO EXCESS CALORIES WITH SERIOUS COMORBIDITY AND BODY MASS INDEX (BMI) OF 50.0 TO 59.9 IN ADULT: ICD-10-CM

## 2024-10-21 DIAGNOSIS — E66.813 CLASS 3 SEVERE OBESITY DUE TO EXCESS CALORIES WITH SERIOUS COMORBIDITY AND BODY MASS INDEX (BMI) OF 50.0 TO 59.9 IN ADULT: ICD-10-CM

## 2024-10-21 DIAGNOSIS — E11.69 TYPE 2 DIABETES MELLITUS WITH OBESITY (HCC): ICD-10-CM

## 2024-10-21 DIAGNOSIS — I10 ESSENTIAL HYPERTENSION: Primary | ICD-10-CM

## 2024-10-21 PROCEDURE — 3074F SYST BP LT 130 MM HG: CPT | Performed by: INTERNAL MEDICINE

## 2024-10-21 PROCEDURE — G8417 CALC BMI ABV UP PARAM F/U: HCPCS | Performed by: INTERNAL MEDICINE

## 2024-10-21 PROCEDURE — G8428 CUR MEDS NOT DOCUMENT: HCPCS | Performed by: INTERNAL MEDICINE

## 2024-10-21 PROCEDURE — 2022F DILAT RTA XM EVC RTNOPTHY: CPT | Performed by: INTERNAL MEDICINE

## 2024-10-21 PROCEDURE — G8484 FLU IMMUNIZE NO ADMIN: HCPCS | Performed by: INTERNAL MEDICINE

## 2024-10-21 PROCEDURE — 3052F HG A1C>EQUAL 8.0%<EQUAL 9.0%: CPT | Performed by: INTERNAL MEDICINE

## 2024-10-21 PROCEDURE — 1036F TOBACCO NON-USER: CPT | Performed by: INTERNAL MEDICINE

## 2024-10-21 PROCEDURE — 99211 OFF/OP EST MAY X REQ PHY/QHP: CPT

## 2024-10-21 PROCEDURE — 3078F DIAST BP <80 MM HG: CPT | Performed by: INTERNAL MEDICINE

## 2024-10-21 PROCEDURE — 99214 OFFICE O/P EST MOD 30 MIN: CPT | Performed by: INTERNAL MEDICINE

## 2024-10-21 RX ORDER — SEMAGLUTIDE 2.68 MG/ML
2 INJECTION, SOLUTION SUBCUTANEOUS
Qty: 3 ML | Refills: 3 | Status: SHIPPED | OUTPATIENT
Start: 2024-10-21

## 2024-10-21 NOTE — TELEPHONE ENCOUNTER
Note from payer: Coverage is provided when the member meets all the followin. Member has a history of at least 120 days of therapy with THREE preferred medications [ONE of the 120 day trials must be Byetta (5 mcg and 10 mcg), Victoza (18 MG/3 ML PEN) (Brand name is preferred) or Trulicity (0.75 mg, 1.5 mg, 3 mg and 4.5 mg)], which include but are not limited to: Farxiga 5 and 10 mg (Brand name is preferred), Glimepiride, Glipizide, Invokana 100 mg and 300 mg, Januvia, Jardiance 10 and 25 mg and Metformin IR and ER (generic of Glucophage XR); 2. Member has had an inadequate clinical response (the inability to reach A1C goal (less than 7%) after at least 120 days of current regimen, with use of two or more drugs concomitantly per ADA (American Diabetes Association) guidelines and, 3. Member has documented adherence and appropriate dose escalation (must achieve maximum recommended dose or document that maximum recommended dose is not tolerated or is clinically inappropriate) and, 4. Documentation includes a patient specific A1C goal if less than 7% and must include current A1C (within the last 6 months). The [facility] Policy for Medical Necessity as posted on the [facility] website and [facility] Preferred Drug List criteria were reviewed and per [place] Administrative Code Rule 5160-1-01 (C) and  (B), a medically necessary service must include: generally accepted standards of medical practice, be clinically appropriate in administration, treatment and outcome and be the lowest cost alternative to effectively treat the condition. Please contact your provider to assist you with other treatment options that might be covered under your benefit package, or other services that might be available through the community.  Payer: Auto Search Patient's Payer Case ID: [id number]    [phone]  Electronic appeal: Not supported

## 2024-10-21 NOTE — PROGRESS NOTES
hba1c is improving but still high. BGL now in 140s.    Plan   -   Rules:  Count every calorie every day  Limit sweets to one day per month  Limit chips/crackers/pretzels/nuts/popcorn to 150 christine/day  Eliminate all sugar sweetened beverages (including fruit juice)  Limit restaurants (including fast food and food from a convenience store) to one time every two weeks while in town    Requirements:  Make sure protein intake is at least 75 grams per day  Make sure that fiber intake is at least 25 grams per day  Take one multivitamin every day    Targets:  Limit calorie intake to 1600 calories/day  Limit net carbohydrate intake to 120 grams/day  Walk 30 minutes daily or equivalent (eg 50 min of stationary bike without resistance)  Avoid eating 2 hours within bedtime.    Ozempic:  Start Ozempic 2 mg under the skin once each week starting 1 week after last dose of 1 mg weekly.    Follow up in about 3 months.    Medication management: Ame Hanson MD  Internal Medicine/Obesity Medicine  10/21/2024.

## 2024-10-21 NOTE — PATIENT INSTRUCTIONS
Rules:  Count every calorie every day  Limit sweets to one day per month  Limit chips/crackers/pretzels/nuts/popcorn to 150 christine/day  Eliminate all sugar sweetened beverages (including fruit juice)  Limit restaurants (including fast food and food from a convenience store) to one time every two weeks while in town    Requirements:  Make sure protein intake is at least 75 grams per day  Make sure that fiber intake is at least 25 grams per day  Take one multivitamin every day    Targets:  Limit calorie intake to 1600 calories/day  Limit net carbohydrate intake to 120 grams/day  Walk 30 minutes daily or equivalent (eg 50 min of stationary bike without resistance)  Avoid eating 2 hours within bedtime.    Ozempic:  Start Ozempic 2 mg under the skin once each week starting 1 week after last dose of 1 mg weekly.    Follow up in about 3 months.

## 2024-12-20 ENCOUNTER — TELEPHONE (OUTPATIENT)
Dept: BARIATRICS/WEIGHT MGMT | Age: 44
End: 2024-12-20

## 2025-02-03 ENCOUNTER — OFFICE VISIT (OUTPATIENT)
Dept: BARIATRICS/WEIGHT MGMT | Age: 45
End: 2025-02-03
Payer: MEDICAID

## 2025-02-03 VITALS
TEMPERATURE: 97.9 F | HEART RATE: 101 BPM | HEIGHT: 70 IN | DIASTOLIC BLOOD PRESSURE: 70 MMHG | BODY MASS INDEX: 41.95 KG/M2 | RESPIRATION RATE: 20 BRPM | SYSTOLIC BLOOD PRESSURE: 124 MMHG | WEIGHT: 293 LBS

## 2025-02-03 DIAGNOSIS — I10 ESSENTIAL HYPERTENSION: Primary | ICD-10-CM

## 2025-02-03 DIAGNOSIS — E11.69 TYPE 2 DIABETES MELLITUS WITH OBESITY (HCC): ICD-10-CM

## 2025-02-03 DIAGNOSIS — E66.813 CLASS 3 SEVERE OBESITY DUE TO EXCESS CALORIES WITH SERIOUS COMORBIDITY AND BODY MASS INDEX (BMI) OF 50.0 TO 59.9 IN ADULT: ICD-10-CM

## 2025-02-03 DIAGNOSIS — E66.01 CLASS 3 SEVERE OBESITY DUE TO EXCESS CALORIES WITH SERIOUS COMORBIDITY AND BODY MASS INDEX (BMI) OF 50.0 TO 59.9 IN ADULT: ICD-10-CM

## 2025-02-03 DIAGNOSIS — E66.9 TYPE 2 DIABETES MELLITUS WITH OBESITY (HCC): ICD-10-CM

## 2025-02-03 PROCEDURE — 3078F DIAST BP <80 MM HG: CPT | Performed by: INTERNAL MEDICINE

## 2025-02-03 PROCEDURE — 3046F HEMOGLOBIN A1C LEVEL >9.0%: CPT | Performed by: INTERNAL MEDICINE

## 2025-02-03 PROCEDURE — 2022F DILAT RTA XM EVC RTNOPTHY: CPT | Performed by: INTERNAL MEDICINE

## 2025-02-03 PROCEDURE — 1036F TOBACCO NON-USER: CPT | Performed by: INTERNAL MEDICINE

## 2025-02-03 PROCEDURE — G8428 CUR MEDS NOT DOCUMENT: HCPCS | Performed by: INTERNAL MEDICINE

## 2025-02-03 PROCEDURE — G8417 CALC BMI ABV UP PARAM F/U: HCPCS | Performed by: INTERNAL MEDICINE

## 2025-02-03 PROCEDURE — 3074F SYST BP LT 130 MM HG: CPT | Performed by: INTERNAL MEDICINE

## 2025-02-03 PROCEDURE — 99211 OFF/OP EST MAY X REQ PHY/QHP: CPT

## 2025-02-03 PROCEDURE — 99214 OFFICE O/P EST MOD 30 MIN: CPT | Performed by: INTERNAL MEDICINE

## 2025-02-03 NOTE — PATIENT INSTRUCTIONS
Rules:  Count every calorie every day  Limit sweets to one day per month  Limit chips/crackers/pretzels/nuts/popcorn to 150 christine/day  Eliminate all sugar sweetened beverages (including fruit juice)  Limit restaurants (including fast food and food from a convenience store) to one time every two weeks while in town    Requirements:  Make sure protein intake is at least 100 grams per day  Make sure that fiber intake is at least 25 grams per day  Take one multivitamin every day    Targets:  Limit calorie intake to 1600 calories/day  Limit net carbohydrate intake to 120 grams/day  Walk 30 minutes daily or chair exercises at home (can be divided)  Avoid eating 2 hours within bedtime.    Mounjaro:  Start Mounjaro by taking 7.5 mg under the skin once a week, starting 1 week after last dose of Ozempic 2 mg.    Follow up in about 3 months.

## 2025-02-03 NOTE — PROGRESS NOTES
CC -   HLD, HTN, DM2, Obesity    BACKGROUND -   Last visit: 10/21/2024  First visit: 7/18/23    Obesity (all weight in lbs)  Initial Ht 68.75\", Wt 405.2,  BMI 60.27  Began after having children, worse after coming from [place] in 2020  HS Grad wt 150-160  Lowest   wt 150   Highest  wt 405.2  Pattern of wt gain: gradual - up and down  Wt change past yr: same as now  Most wt lost: 40 lbs  Other diets attempted: small portions, was seeing weight loss doctor in [place], was going to gym.    Desire to lose weight: 10/10 (has thought about surgery)    Initial Diet:    Number of meals per day - 3    Number of snacks per day - 2-3    Meal volume - 12\" plate,  occasional seconds    Fast food/convenience store - 0x/week    Restaurants (not fast food) - 0x/week   Sweets - 7d/week (mary cakes)   Chips - 0d/week   Crackers/pretzels - 0d/week (eats cheese)   Nuts - 0d/week   Peanut Butter - 2d/week (PB sandwich)   Popcorn - 0d/week   Dried fruit - 0d/week   Whole fruit - 3-4d/week   Breakfast cereal - 0d/week   Granola/Protein/Energy bar - 7d/week (granola bar 100 [address])   Sugar sweetened beverages - 3 cans/wk pop/soda, no fruit juice, no coffee, no tea, no energy drinks   Protein - No supplements   Fiber - No supplements     Initial Exercise:    Gym membership - No. Has stationary bike at home - has not used it in about 2 months.    Walking - no    Running - no    Resistance - has weights at home, but has not used it    Aerobic class - no     Sleep: about 5 hrs/night, trouble falling asleep, wakes up in between - 3-4x/night. Smt rested smt tired in am. No daytime naps (has 2 little kids).  ______________________    PFSH -  Past Medical History:   Diagnosis Date    Bone spur     left heel    DDD (degenerative disc disease), lumbar 10/09/2012    DM2 (diabetes mellitus, type 2) (HCC)     Hyperlipidemia     Hypertension     Thyroid disease      Past Surgical History:   Procedure Laterality Date    HYSTERECTOMY (CERVIX STATUS

## 2025-02-05 ENCOUNTER — TRANSCRIBE ORDERS (OUTPATIENT)
Dept: ADMINISTRATIVE | Age: 45
End: 2025-02-05

## 2025-02-05 DIAGNOSIS — Z12.31 ENCOUNTER FOR SCREENING MAMMOGRAM FOR MALIGNANT NEOPLASM OF BREAST: Primary | ICD-10-CM

## 2025-02-08 ENCOUNTER — HOSPITAL ENCOUNTER (OUTPATIENT)
Dept: MAMMOGRAPHY | Age: 45
Discharge: HOME | End: 2025-02-10
Attending: OBSTETRICS & GYNECOLOGY
Payer: MEDICAID

## 2025-02-08 DIAGNOSIS — Z12.31 ENCOUNTER FOR SCREENING MAMMOGRAM FOR MALIGNANT NEOPLASM OF BREAST: ICD-10-CM

## 2025-02-08 PROCEDURE — 77063 BREAST TOMOSYNTHESIS BI: CPT

## 2025-02-10 ENCOUNTER — TELEPHONE (OUTPATIENT)
Dept: MAMMOGRAPHY | Age: 45
End: 2025-02-10

## 2025-02-10 NOTE — TELEPHONE ENCOUNTER
Order request faxed to Dr. Avery for left breast diagnostic mammogram and limited ultrasound as recommended from mammogram performed at [facility] on 2/8/25. Successful fax confirmation. SASHA BrownN, RN -Breast Navigator

## 2025-02-11 ENCOUNTER — TELEPHONE (OUTPATIENT)
Dept: MAMMOGRAPHY | Age: 45
End: 2025-02-11

## 2025-02-11 NOTE — TELEPHONE ENCOUNTER
Call to patient in reference to her mammogram performed at [facility] on 2/8/25. Instructed patient that the radiologist has recommended additional breast imaging in order to make a final determination and further recommendations. Patient verbalized understanding and is agreeable to proceed at [facility]. Orders received per Dr. Avery and scanned into media. Patient scheduled for 2/17/25 at 2 pm. JADA Brown, RN -Breast Navigator

## 2025-02-17 ENCOUNTER — HOSPITAL ENCOUNTER (OUTPATIENT)
Dept: MAMMOGRAPHY | Age: 45
Discharge: HOME | End: 2025-02-19
Payer: MEDICAID

## 2025-02-17 ENCOUNTER — HOSPITAL ENCOUNTER (OUTPATIENT)
Dept: ULTRASOUND IMAGING | Age: 45
Discharge: HOME | End: 2025-02-17
Payer: MEDICAID

## 2025-02-17 DIAGNOSIS — R92.8 ABNORMAL MAMMOGRAM: ICD-10-CM

## 2025-02-17 PROCEDURE — 76642 ULTRASOUND BREAST LIMITED: CPT

## 2025-02-17 PROCEDURE — G0279 TOMOSYNTHESIS, MAMMO: HCPCS

## 2025-02-18 ENCOUNTER — INITIAL CONSULT (OUTPATIENT)
Dept: BARIATRICS/WEIGHT MGMT | Age: 45
End: 2025-02-18
Payer: MEDICAID

## 2025-02-18 VITALS — BODY MASS INDEX: 41.95 KG/M2 | WEIGHT: 293 LBS | HEIGHT: 70 IN

## 2025-02-18 DIAGNOSIS — E66.01 MORBID OBESITY DUE TO EXCESS CALORIES: Primary | ICD-10-CM

## 2025-02-18 DIAGNOSIS — Z71.3 DIETARY COUNSELING: ICD-10-CM

## 2025-02-18 PROCEDURE — G8428 CUR MEDS NOT DOCUMENT: HCPCS | Performed by: DIETITIAN, REGISTERED

## 2025-02-18 PROCEDURE — 97802 MEDICAL NUTRITION INDIV IN: CPT | Performed by: DIETITIAN, REGISTERED

## 2025-02-18 PROCEDURE — G8417 CALC BMI ABV UP PARAM F/U: HCPCS | Performed by: DIETITIAN, REGISTERED

## 2025-02-18 NOTE — PROGRESS NOTES
Medical Nutrition Therapy - Initial medical wt loss     Pt. Name: Collette Hamilton     NUTRITION ASSESSMENT  Food Records Kept: No  24Hour Recall Completed at Office:Yes  B - None  L - None  S - 1 sandwich with 2 slices keto bread, turkey breast, 1 slice cheese, 2 Large Little [place]ana Honey Bun, pt drinks fruit juice 16-20 oz or more throughout the evening.  Main intake is the large honey buns and fruit juice.  Note:  pt uses protein shake but just 3 days per week on exercise days.      Height: 1.778 m (5' 10\") Weight: (!) 164.8 kg (363 lb 6.4 oz) BMI: 52.14 kg/m² IBW:ideal body weight   173 lbs  Pt weighed 363.4 lbs today, indicating a loss of 41.8 lbs since beginning weight loss plan (405.2 lbs on 7/18/24).    Food Allergies:  none  Food Intolerances:  none    How many meals per day 1 in the evening with grazing    Labs: Hgb A1c 7.3H on 12/18/24    Vitamin Supplements: none    Estimated Daily Nutritional Needs   Daily Energy Needs (DEN):  2463    Protein: 100 grams Daily  Fiber:  25 grams/day  Water:  64 oz daily  Protein supplements: Pt. is currently using the following protein supplement Ensure protein (pt did not give exact product) and consuming the following grams of protein Pr.   Yes   Rd / Ld reviewed with patient his/her daily protein, fiber, and water/fluids goals.       Dietary Compliance  Poor     NUTRITION DIAGNOSIS:      PES - morbid obesity related to excess calories as evidenced by BMI greater than 40    NUTRITION INTERVENTION - Nutrition Care Plan     Goal:  Patient able to verbalize the following dietary concepts:      100 grams of protein/day  25 grams of fiber/day  64 ounces of water daily      Pt is aware wt loss is pt controlled.          Plan is as follows, per Dr Hanson:    Rules:  Count every calorie every day  Limit sweets to one day per month  Limit chips/crackers/pretzels/nuts/popcorn to 150 christine/day  Eliminate all sugar sweetened beverages (including fruit juice)  Limit restaurants

## 2025-02-18 NOTE — PATIENT INSTRUCTIONS
Tips/Suggestions:    Focus first and foremost on the following elements of your plan:  Count all calories you take in, or be very aware if not counting all calories (check food lables, use google to look foods up, etc)  Limit to 1600 calories per day    As you get these items under control, strive to comply with all elements that are part of your weight loss plan.    Other important items:  Find alternatives for sweet items - fruit or a protein shake can work  Increase protein to 100 grams per day - Protein shakes to look for:  Premier, Pure Protein, Ensure Max, Boost Max, [employer] Core power or Nutrition Plan shakes  Increase your daily fiber intake  Bike or walk or do exercise YOU enjoy  - 30 minutes per day    Contact:  Yannick Hamilton RD, LD   [phone]

## 2025-02-20 ENCOUNTER — TELEPHONE (OUTPATIENT)
Dept: BARIATRICS/WEIGHT MGMT | Age: 45
End: 2025-02-20

## 2025-02-20 NOTE — TELEPHONE ENCOUNTER
Note from payer: Coverage is provided when the member meets all the following: Member has had an inadequate clinical response (the inability to reach A1C goal (less than 7%) after at least 120 days of current regimen, with use of two or more drugs concomitantly per ADA (American Diabetes Association) guidelines and, Member has documented adherence and appropriate dose escalation (must achieve maximum recommended dose or document that maximum recommended dose is not tolerated or is clinically inappropriate) and, Documentation includes a patient specific A1C goal if less than 7% and must include current A1C (within the last 6 months). The [facility] Policy for Medical Necessity as posted on the [facility] website and [facility] Preferred Drug List criteria were reviewed and per [place] Administrative Code Rule 5160-1-01 (C) and 5160-26-03 (B), a medically necessary service must include: generally accepted standards of medical practice, be clinically appropriate in administration, treatment and outcome and be the lowest cost alternative to effectively treat the condition. Please contact your provider to assist you with other treatment options that might be covered under your benefit package, or other services that might be available through the community.  Payer: [employer] Patient's Payer Case ID: [id number]    [phone]

## 2025-02-27 DIAGNOSIS — E11.69 TYPE 2 DIABETES MELLITUS WITH OBESITY (HCC): Primary | ICD-10-CM

## 2025-02-27 DIAGNOSIS — E66.9 TYPE 2 DIABETES MELLITUS WITH OBESITY (HCC): Primary | ICD-10-CM

## 2025-02-27 NOTE — PROGRESS NOTES
Mounjaro was denied by insurance. We planned to continue Ozempic 2 mg weekly, talked to patient over the phone.    Rodri Hanson MD  Internal Medicine/Obesity Medicine  2/27/2025.

## 2025-04-01 DIAGNOSIS — M17.11 PRIMARY OSTEOARTHRITIS OF RIGHT KNEE: Primary | ICD-10-CM

## 2025-04-01 RX ORDER — DICLOFENAC SODIUM 75 MG/1
75 TABLET, DELAYED RELEASE ORAL 2 TIMES DAILY
Qty: 60 TABLET | Refills: 3 | Status: SHIPPED | OUTPATIENT
Start: 2025-04-01 | End: 2025-07-30

## 2025-04-07 ENCOUNTER — INITIAL CONSULT (OUTPATIENT)
Age: 45
End: 2025-04-07

## 2025-04-07 VITALS — BODY MASS INDEX: 41.95 KG/M2 | WEIGHT: 293 LBS | HEIGHT: 70 IN

## 2025-04-07 DIAGNOSIS — Z71.3 DIETARY COUNSELING: ICD-10-CM

## 2025-04-07 DIAGNOSIS — E66.01 MORBID OBESITY DUE TO EXCESS CALORIES: Primary | ICD-10-CM

## 2025-04-07 NOTE — PATIENT INSTRUCTIONS
Tips/Suggestions:    Focus first and foremost on the following elements of your plan:  Count all calories you take in, every day  Limit to 1600 calories per day       Other important items:  Find alternatives for sweet items - fruit or a protein shake can work  Increase protein to 100 grams per day - Protein shakes to look for:  Premier, Pure Protein, Ensure Max, Boost Max, [employer] Core power or Nutrition Plan shakes (find [employer] Nutrition plan at [employer]).  Increase your daily fiber intake  Bike or walk or do exercise YOU enjoy  - 30 minutes per day     Contact:  Yannick Hamilton RD, LD   [phone]

## 2025-04-07 NOTE — PROGRESS NOTES
NOTE:  This patient received Medical Nutrition Management; initial assessment and intervention , in an individual, face-to-face encounter with an RD/LD on 2/18/25 at the [facility].       Medical Nutrition Therapy - Follow up medical wt loss     Pt. Name: Collette Hamilton     NUTRITION ASSESSMENT  Food Records Kept: Yes  24Hour Recall Completed at Office:No    Height: 1.778 m (5' 10\") Weight:   BMI: 52.14 kg/m²    Ideal body weight: 173 lbs   Pt weighed 361.5 lbs today, indicating a loss of 43.7 lbs since beginning weight loss plan (405.2 lbs on 7/18/24).    Food Allergies:  none  Food Intolerances:  none    How many meals per day 2 (Note:  the first 2 days after Sunday Ozempic injection, just 1 meal daily, in evening    Vitamin Supplements: Multivitamin    Labs: available labs reviewed     Estimated Daily Nutritional Needs   Daily Energy Needs (DEN):  2463    Protein: 100 grams Daily  Fiber:  25 grams/day  Water:  64 oz daily  Protein supplements: Pt. is currently using the following protein supplement Ensure (cautioned pt to find a protein supplement that h.   Yes   Rd / Ld reviewed with patient his/her daily protein, fiber, and water/fluids goals.       Dietary Compliance  Fair     NUTRITION DIAGNOSIS:      PES - Fair   - pt made some good changes, however, is consuming sugar and juices and sugar sweetened beverages (lemonade), and not meeting protein goal.  Uses Ensure original (has sugar and high calories).      NUTRITION INTERVENTION - Nutrition Care Plan     Goal:  Patient able to verbalize the following dietary concepts:      100 grams of protein/day  25 grams of fiber/day  64 ounces of water daily      Pt is aware wt loss is pt controlled.          Plan is as follows, per Dr Hanson  :    Rules:  Count every calorie every day  Limit sweets to one day per month  Limit chips/crackers/pretzels/nuts/popcorn to 150 christine/day  Eliminate all sugar sweetened beverages (including fruit

## 2025-05-21 ENCOUNTER — INITIAL CONSULT (OUTPATIENT)
Age: 45
End: 2025-05-21
Payer: MEDICAID

## 2025-05-21 ENCOUNTER — OFFICE VISIT (OUTPATIENT)
Age: 45
End: 2025-05-21
Payer: MEDICAID

## 2025-05-21 VITALS
HEART RATE: 96 BPM | SYSTOLIC BLOOD PRESSURE: 108 MMHG | HEIGHT: 70 IN | TEMPERATURE: 97 F | WEIGHT: 293 LBS | DIASTOLIC BLOOD PRESSURE: 55 MMHG | BODY MASS INDEX: 41.95 KG/M2

## 2025-05-21 VITALS — HEIGHT: 70 IN | BODY MASS INDEX: 41.95 KG/M2 | WEIGHT: 293 LBS

## 2025-05-21 DIAGNOSIS — Z71.3 DIETARY COUNSELING: ICD-10-CM

## 2025-05-21 DIAGNOSIS — Z13.29 THYROID DISORDER SCREEN: ICD-10-CM

## 2025-05-21 DIAGNOSIS — I10 ESSENTIAL HYPERTENSION: Primary | ICD-10-CM

## 2025-05-21 DIAGNOSIS — E66.9 TYPE 2 DIABETES MELLITUS WITH OBESITY (HCC): ICD-10-CM

## 2025-05-21 DIAGNOSIS — E66.01 MORBID OBESITY DUE TO EXCESS CALORIES (HCC): Primary | ICD-10-CM

## 2025-05-21 DIAGNOSIS — E66.813 CLASS 3 SEVERE OBESITY DUE TO EXCESS CALORIES WITH SERIOUS COMORBIDITY AND BODY MASS INDEX (BMI) OF 50.0 TO 59.9 IN ADULT (HCC): ICD-10-CM

## 2025-05-21 DIAGNOSIS — E11.69 TYPE 2 DIABETES MELLITUS WITH OBESITY (HCC): ICD-10-CM

## 2025-05-21 PROCEDURE — 3046F HEMOGLOBIN A1C LEVEL >9.0%: CPT | Performed by: INTERNAL MEDICINE

## 2025-05-21 PROCEDURE — G8428 CUR MEDS NOT DOCUMENT: HCPCS | Performed by: DIETITIAN, REGISTERED

## 2025-05-21 PROCEDURE — 3078F DIAST BP <80 MM HG: CPT | Performed by: INTERNAL MEDICINE

## 2025-05-21 PROCEDURE — G8417 CALC BMI ABV UP PARAM F/U: HCPCS | Performed by: DIETITIAN, REGISTERED

## 2025-05-21 PROCEDURE — 99213 OFFICE O/P EST LOW 20 MIN: CPT | Performed by: INTERNAL MEDICINE

## 2025-05-21 PROCEDURE — 99214 OFFICE O/P EST MOD 30 MIN: CPT | Performed by: INTERNAL MEDICINE

## 2025-05-21 PROCEDURE — 2022F DILAT RTA XM EVC RTNOPTHY: CPT | Performed by: INTERNAL MEDICINE

## 2025-05-21 PROCEDURE — 3074F SYST BP LT 130 MM HG: CPT | Performed by: INTERNAL MEDICINE

## 2025-05-21 PROCEDURE — G8417 CALC BMI ABV UP PARAM F/U: HCPCS | Performed by: INTERNAL MEDICINE

## 2025-05-21 PROCEDURE — 97803 MED NUTRITION INDIV SUBSEQ: CPT | Performed by: DIETITIAN, REGISTERED

## 2025-05-21 PROCEDURE — G8428 CUR MEDS NOT DOCUMENT: HCPCS | Performed by: INTERNAL MEDICINE

## 2025-05-21 PROCEDURE — 1036F TOBACCO NON-USER: CPT | Performed by: INTERNAL MEDICINE

## 2025-05-21 NOTE — PROGRESS NOTES
NOTE:  This patient received Medical Nutrition Management; initial assessment and intervention , in an individual, face-to-face encounter with an RD/LD on 2/18/25 at the [facility].       Medical Nutrition Therapy - Follow up medical wt loss     Pt. Name: Collette Hamilton     NUTRITION ASSESSMENT  Food Records Kept: Yes  24Hour Recall Completed at Office:No   Based on pt reported diet hx/diet recall and discussion, there are no cultural or [employer] food preferences, dietary restrictions or [employer] practices identified at this time.      Height: 1.778 m (5' 10\") Weight: (!) 162.6 kg (358 lb 6.4 oz) BMI: 51.43 kg/m²    Ideal body weight: 173 lbs  Pt weighed 358.4 lbs today, indicating a loss of 46.8 lbs since beginning weight loss plan (405.2 lbs on 7/18/23).    Food Allergies:  none  Food Intolerances:  none    How many meals per day 3 (improved from 2 daily)    Vitamin Supplements:  daily multivitamin    Labs: available labs reviewed    Estimated Daily Nutritional Needs (per medical weight loss doctor/practitioner calculations):  Daily Energy Needs (DEN):  2463    Protein: 100 grams Daily  Fiber:  25 grams/day  Water:  64 oz daily  Protein supplements: Pt. is currently using the following protein supplement none and not meeting daily goal - suggested a 30 g per bottle liquid supplement daily.   Yes   Rd / Ld reviewed with patient his/her daily protein, fiber, and water/fluids goals.       Dietary Compliance  Fair    improved, but, not counting and not meeting protein goal daily.    NUTRITION DIAGNOSIS:      PES - morbid obesity related to excessive calorie intake as evidency by BMI greater than 40.0      NUTRITION INTERVENTION - Nutrition Care Plan     Goal:  Patient able to verbalize the following dietary concepts:      100 grams of protein/day  25 grams of fiber/day  64 ounces of water daily      Pt is aware wt loss is pt controlled.          Plan is as follows, per

## 2025-05-21 NOTE — PATIENT INSTRUCTIONS
Rules:  Count every calorie every day  Limit sweets to one day per month  Limit chips/crackers/pretzels/nuts/popcorn to 150 christine/day  Eliminate all sugar sweetened beverages (including fruit juice)  Limit restaurants (including fast food and food from a convenience store) to one time every two weeks while in town    Requirements:  Make sure protein intake is at least 100 grams per day  Make sure that fiber intake is at least 25 grams per day  Take one multivitamin every day    Targets:  Limit calorie intake to 1600 calories/day  Limit net carbohydrate intake to 120 grams/day  Walk 30 minutes daily or chair exercises at home (can be divided)  Avoid eating 2 hours within bedtime.    Ozempic:  Continue Ozempic 2 mg under the skin once a week.    Contrave:  Begin taking Contrave 8/90 mg according to the following schedule:   Week 1 - Take one tablet every morning with breakfast   Week 2 - Take one tablet every morning with breakfast and one tablet every evening with dinner   Week 3 - Take two tablets every morning with breakfast and one tablet every evening with dinner   Week 4 - Take two tablets every morning with breakfast and two tablets every evening with dinner    While taking Contrave, check the Blood Pressure every morning and every evening for four days after each dose change.    If the systolic BP is consistently >155 mmHg or the diastolic BP is consistently >90 mm/Hg or if the heart rate is consistently > 100 beats per minute, then stop taking Contrave (contact me for instructions on how to do this because it will have to be tapered off:  [phone]).     If the systolic BP >170 mmHg or the diastolic BP is >100 mmHg (even if it is only once), then Contrave should be stopped without proving that it is consistently elevated (contact me for instructions).    Follow up in 2-3 months.

## 2025-05-21 NOTE — PATIENT INSTRUCTIONS
Tips/Suggestions:    Focus first and foremost on the following elements of your plan:  Count all calories you take in, every day  Limit to 1600 calories per day    As you get the above items under control, strive to comply with all other elements that are part of your weight loss plan.    Other important items:  Avoid sweets except once per month  Increase protein to 100 grams per day - Consider a 30 gram protein supplement (Premier, Pure protein, or [employer])  Increase your daily fiber intake - consider Fiber Well Gummies by [employer] or an equivalent fiber gummy.  Take 2 daily and gradually increase to 6 daily  Walk or do exercise YOU enjoy  - 30 minutes per day    Contact:  Yannick Hamilton RD, LD   [phone]

## 2025-05-21 NOTE — PROGRESS NOTES
CC -   HLD, HTN, DM2, Obesity    BACKGROUND -   Last visit: 2/3/2025  First visit: 7/18/23    Obesity (all weight in lbs)  Initial Ht 68.75\", Wt 405.2,  BMI 60.27  Began after having children, worse after coming from [place] in 2020  HS Grad wt 150-160  Lowest   wt 150   Highest  wt 405.2  Pattern of wt gain: gradual - up and down  Wt change past yr: same as now  Most wt lost: 40 lbs  Other diets attempted: small portions, was seeing weight loss doctor in [place], was going to gym.    Desire to lose weight: 10/10 (has thought about surgery)    Initial Diet:    Number of meals per day - 3    Number of snacks per day - 2-3    Meal volume - 12\" plate,  occasional seconds    Fast food/convenience store - 0x/week    Restaurants (not fast food) - 0x/week   Sweets - 7d/week (mary cakes)   Chips - 0d/week   Crackers/pretzels - 0d/week (eats cheese)   Nuts - 0d/week   Peanut Butter - 2d/week (PB sandwich)   Popcorn - 0d/week   Dried fruit - 0d/week   Whole fruit - 3-4d/week   Breakfast cereal - 0d/week   Granola/Protein/Energy bar - 7d/week (granola bar 100 [address])   Sugar sweetened beverages - 3 cans/wk pop/soda, no fruit juice, no coffee, no tea, no energy drinks   Protein - No supplements   Fiber - No supplements     Initial Exercise:    Gym membership - No. Has stationary bike at home - has not used it in about 2 months.    Walking - no    Running - no    Resistance - has weights at home, but has not used it    Aerobic class - no     Sleep: about 5 hrs/night, trouble falling asleep, wakes up in between - 3-4x/night. Smt rested smt tired in am. No daytime naps (has 2 little kids).  ______________________    PFSH -  Past Medical History:   Diagnosis Date    Bone spur     left heel    DDD (degenerative disc disease), lumbar 10/09/2012    DM2 (diabetes mellitus, type 2) (HCC)     Hyperlipidemia     Hypertension     Thyroid disease      Past Surgical History:   Procedure Laterality Date    HYSTERECTOMY (CERVIX STATUS

## 2025-06-14 ENCOUNTER — HOSPITAL ENCOUNTER (OUTPATIENT)
Age: 45
Discharge: HOME | End: 2025-06-14
Payer: MEDICAID

## 2025-06-14 DIAGNOSIS — E11.69 TYPE 2 DIABETES MELLITUS WITH OBESITY (HCC): ICD-10-CM

## 2025-06-14 DIAGNOSIS — Z13.29 THYROID DISORDER SCREEN: ICD-10-CM

## 2025-06-14 DIAGNOSIS — E66.9 TYPE 2 DIABETES MELLITUS WITH OBESITY (HCC): ICD-10-CM

## 2025-06-14 DIAGNOSIS — E66.813 CLASS 3 SEVERE OBESITY DUE TO EXCESS CALORIES WITH SERIOUS COMORBIDITY AND BODY MASS INDEX (BMI) OF 50.0 TO 59.9 IN ADULT (HCC): ICD-10-CM

## 2025-06-14 LAB
25(OH)D3 SERPL-MCNC: 11.6 NG/ML (ref 30–100)
ALBUMIN SERPL-MCNC: 3.9 G/DL (ref 3.5–5.2)
ALP SERPL-CCNC: 80 U/L (ref 35–104)
ALT SERPL-CCNC: 9 U/L (ref 0–35)
ANION GAP SERPL CALCULATED.3IONS-SCNC: 11 MMOL/L (ref 7–16)
AST SERPL-CCNC: 15 U/L (ref 0–35)
BACTERIA URNS QL MICRO: ABNORMAL
BASOPHILS # BLD: 0.06 K/UL (ref 0–0.2)
BASOPHILS NFR BLD: 1 % (ref 0–2)
BILIRUB DIRECT SERPL-MCNC: 0.1 MG/DL (ref 0–0.2)
BILIRUB SERPL-MCNC: 0.3 MG/DL (ref 0–1.2)
BILIRUB UR QL STRIP: NEGATIVE
BUN SERPL-MCNC: 11 MG/DL (ref 6–20)
CALCIUM SERPL-MCNC: 9.1 MG/DL (ref 8.6–10)
CHLORIDE SERPL-SCNC: 105 MMOL/L (ref 98–107)
CHOLEST SERPL-MCNC: 232 MG/DL
CLARITY UR: ABNORMAL
CO2 SERPL-SCNC: 24 MMOL/L (ref 22–29)
COLOR UR: YELLOW
CREAT SERPL-MCNC: 0.6 MG/DL (ref 0.5–1)
EOSINOPHIL # BLD: 0.2 K/UL (ref 0.05–0.5)
EOSINOPHILS RELATIVE PERCENT: 2 % (ref 0–6)
EPI CELLS #/AREA URNS HPF: ABNORMAL /HPF
ERYTHROCYTE [DISTWIDTH] IN BLOOD BY AUTOMATED COUNT: 13.3 % (ref 12–16)
ERYTHROCYTE [SEDIMENTATION RATE] IN BLOOD BY WESTERGREN METHOD: 45 MM/HR (ref 0–20)
GFR, ESTIMATED: >90 ML/MIN/1.73M2
GLUCOSE SERPL-MCNC: 145 MG/DL (ref 74–99)
GLUCOSE UR STRIP-MCNC: NEGATIVE MG/DL
HBA1C MFR BLD: 7.1 % (ref 4–5.6)
HCT VFR BLD AUTO: 39.3 % (ref 34–48)
HDLC SERPL-MCNC: 49 MG/DL
HGB BLD-MCNC: 12.7 G/DL (ref 11.5–15.5)
HGB UR QL STRIP.AUTO: NEGATIVE
IMM GRANULOCYTES # BLD AUTO: <0.03 K/UL (ref 0–0.58)
IMM GRANULOCYTES NFR BLD: 0 % (ref 0–5)
IRON SERPL-MCNC: 63 UG/DL (ref 37–145)
KETONES UR STRIP-MCNC: NEGATIVE MG/DL
LDLC SERPL CALC-MCNC: 148 MG/DL
LEUKOCYTE ESTERASE UR QL STRIP: NEGATIVE
LYMPHOCYTES NFR BLD: 3.18 K/UL (ref 1.5–4)
LYMPHOCYTES RELATIVE PERCENT: 35 % (ref 20–42)
MCH RBC QN AUTO: 29.5 PG (ref 26–35)
MCHC RBC AUTO-ENTMCNC: 32.3 G/DL (ref 32–34.5)
MCV RBC AUTO: 91.4 FL (ref 80–99.9)
MONOCYTES NFR BLD: 0.31 K/UL (ref 0.1–0.95)
MONOCYTES NFR BLD: 3 % (ref 2–12)
NEUTROPHILS NFR BLD: 59 % (ref 43–80)
NEUTS SEG NFR BLD: 5.42 K/UL (ref 1.8–7.3)
NITRITE UR QL STRIP: NEGATIVE
PH UR STRIP: 5.5 [PH] (ref 5–8)
PLATELET # BLD AUTO: 276 K/UL (ref 130–450)
PMV BLD AUTO: 11.1 FL (ref 7–12)
POTASSIUM SERPL-SCNC: 4.1 MMOL/L (ref 3.5–5.1)
PROT SERPL-MCNC: 7.1 G/DL (ref 6.4–8.3)
PROT UR STRIP-MCNC: NEGATIVE MG/DL
RBC # BLD AUTO: 4.3 M/UL (ref 3.5–5.5)
RBC #/AREA URNS HPF: ABNORMAL /HPF
SODIUM SERPL-SCNC: 139 MMOL/L (ref 136–145)
SP GR UR STRIP: 1.02 (ref 1–1.03)
T4 FREE SERPL-MCNC: 0.9 NG/DL (ref 0.9–1.7)
TRIGL SERPL-MCNC: 178 MG/DL
TSH SERPL DL<=0.05 MIU/L-ACNC: 0.88 UIU/ML (ref 0.27–4.2)
URATE SERPL-MCNC: 5.2 MG/DL (ref 2.4–5.7)
UROBILINOGEN UR STRIP-ACNC: 0.2 EU/DL (ref 0–1)
VLDLC SERPL CALC-MCNC: 36 MG/DL
WBC #/AREA URNS HPF: ABNORMAL /HPF
WBC OTHER # BLD: 9.2 K/UL (ref 4.5–11.5)

## 2025-06-14 PROCEDURE — 80061 LIPID PANEL: CPT

## 2025-06-14 PROCEDURE — 82248 BILIRUBIN DIRECT: CPT

## 2025-06-14 PROCEDURE — 85652 RBC SED RATE AUTOMATED: CPT

## 2025-06-14 PROCEDURE — 83540 ASSAY OF IRON: CPT

## 2025-06-14 PROCEDURE — 80053 COMPREHEN METABOLIC PANEL: CPT

## 2025-06-14 PROCEDURE — 36415 COLL VENOUS BLD VENIPUNCTURE: CPT

## 2025-06-14 PROCEDURE — 81001 URINALYSIS AUTO W/SCOPE: CPT

## 2025-06-14 PROCEDURE — 84550 ASSAY OF BLOOD/URIC ACID: CPT

## 2025-06-14 PROCEDURE — 82306 VITAMIN D 25 HYDROXY: CPT

## 2025-06-14 PROCEDURE — 85025 COMPLETE CBC W/AUTO DIFF WBC: CPT

## 2025-06-14 PROCEDURE — 84443 ASSAY THYROID STIM HORMONE: CPT

## 2025-06-14 PROCEDURE — 84439 ASSAY OF FREE THYROXINE: CPT

## 2025-06-14 PROCEDURE — 83036 HEMOGLOBIN GLYCOSYLATED A1C: CPT

## 2025-06-24 ENCOUNTER — HOSPITAL ENCOUNTER (EMERGENCY)
Age: 45
Discharge: HOME | End: 2025-06-24
Attending: EMERGENCY MEDICINE
Payer: MEDICAID

## 2025-06-24 VITALS
OXYGEN SATURATION: 100 % | HEART RATE: 91 BPM | DIASTOLIC BLOOD PRESSURE: 98 MMHG | SYSTOLIC BLOOD PRESSURE: 161 MMHG | TEMPERATURE: 98.1 F | RESPIRATION RATE: 18 BRPM

## 2025-06-24 DIAGNOSIS — R30.0 DYSURIA: Primary | ICD-10-CM

## 2025-06-24 LAB
BACTERIA URNS QL MICRO: ABNORMAL
BILIRUB UR QL STRIP: NEGATIVE
CLARITY UR: CLEAR
COLOR UR: YELLOW
GLUCOSE UR STRIP-MCNC: NEGATIVE MG/DL
HGB UR QL STRIP.AUTO: NEGATIVE
KETONES UR STRIP-MCNC: NEGATIVE MG/DL
LEUKOCYTE ESTERASE UR QL STRIP: NEGATIVE
NITRITE UR QL STRIP: NEGATIVE
PH UR STRIP: 5.5 [PH] (ref 5–8)
PROT UR STRIP-MCNC: NEGATIVE MG/DL
RBC #/AREA URNS HPF: ABNORMAL /HPF
SP GR UR STRIP: >1.03 (ref 1–1.03)
UROBILINOGEN UR STRIP-ACNC: 0.2 EU/DL (ref 0–1)
WBC #/AREA URNS HPF: ABNORMAL /HPF

## 2025-06-24 PROCEDURE — 99283 EMERGENCY DEPT VISIT LOW MDM: CPT

## 2025-06-24 PROCEDURE — 81001 URINALYSIS AUTO W/SCOPE: CPT

## 2025-06-24 RX ORDER — PHENAZOPYRIDINE HYDROCHLORIDE 100 MG/1
100 TABLET, FILM COATED ORAL 3 TIMES DAILY PRN
Qty: 6 TABLET | Refills: 0 | Status: SHIPPED | OUTPATIENT
Start: 2025-06-24 | End: 2025-06-27

## 2025-06-24 RX ORDER — NITROFURANTOIN 25; 75 MG/1; MG/1
100 CAPSULE ORAL 2 TIMES DAILY
Qty: 20 CAPSULE | Refills: 0 | Status: SHIPPED | OUTPATIENT
Start: 2025-06-24 | End: 2025-07-04

## 2025-06-24 ASSESSMENT — PAIN DESCRIPTION - PAIN TYPE: TYPE: ACUTE PAIN

## 2025-06-24 ASSESSMENT — ENCOUNTER SYMPTOMS
SHORTNESS OF BREATH: 0
DIARRHEA: 0
ABDOMINAL DISTENTION: 0
BACK PAIN: 0
SORE THROAT: 0
EYE DISCHARGE: 0
VOMITING: 0
EYE PAIN: 0
WHEEZING: 0
NAUSEA: 0
COUGH: 0
EYE REDNESS: 0
SINUS PRESSURE: 0

## 2025-06-24 ASSESSMENT — PAIN DESCRIPTION - LOCATION: LOCATION: VAGINA

## 2025-06-24 ASSESSMENT — PAIN DESCRIPTION - FREQUENCY: FREQUENCY: CONTINUOUS

## 2025-06-24 ASSESSMENT — PAIN - FUNCTIONAL ASSESSMENT
PAIN_FUNCTIONAL_ASSESSMENT: PREVENTS OR INTERFERES SOME ACTIVE ACTIVITIES AND ADLS
PAIN_FUNCTIONAL_ASSESSMENT: 0-10

## 2025-06-24 ASSESSMENT — PAIN DESCRIPTION - DESCRIPTORS: DESCRIPTORS: DISCOMFORT;ITCHING

## 2025-06-24 ASSESSMENT — PAIN SCALES - GENERAL: PAINLEVEL_OUTOF10: 8

## 2025-06-24 NOTE — ED PROVIDER NOTES
The history is provided by the patient.   Dysuria   This is a new problem. The current episode started yesterday. The problem occurs every urination. The quality of the pain is described as burning. The pain is moderate. There has been no fever. Associated symptoms include frequency and urgency. Pertinent negatives include no chills, no nausea, no vomiting, no discharge and no hematuria.        Review of Systems   Constitutional:  Negative for chills and fever.   HENT:  Negative for ear pain, sinus pressure and sore throat.    Eyes:  Negative for pain, discharge and redness.   Respiratory:  Negative for cough, shortness of breath and wheezing.    Cardiovascular:  Negative for chest pain.   Gastrointestinal:  Negative for abdominal distention, diarrhea, nausea and vomiting.   Genitourinary:  Positive for dysuria, frequency and urgency. Negative for hematuria.   Musculoskeletal:  Negative for arthralgias and back pain.   Skin:  Negative for rash and wound.   Neurological:  Negative for weakness and headaches.   Hematological:  Negative for adenopathy.   All other systems reviewed and are negative.       Physical Exam  Vitals and nursing note reviewed.   Constitutional:       Appearance: She is well-developed.   HENT:      Head: Normocephalic and atraumatic.   Eyes:      Pupils: Pupils are equal, round, and reactive to light.   Cardiovascular:      Rate and Rhythm: Normal rate and regular rhythm.      Heart sounds: Normal heart sounds. No murmur heard.  Pulmonary:      Effort: Pulmonary effort is normal. No respiratory distress.      Breath sounds: Normal breath sounds. No wheezing or rales.   Abdominal:      General: Bowel sounds are normal.      Palpations: Abdomen is soft.      Tenderness: There is no abdominal tenderness. There is no guarding or rebound.   Musculoskeletal:      Cervical back: Normal range of motion and neck supple.   Skin:     General: Skin is warm and dry.   Neurological:      Mental Status: She

## 2025-07-03 ENCOUNTER — TELEPHONE (OUTPATIENT)
Age: 45
End: 2025-07-03

## 2025-07-03 NOTE — TELEPHONE ENCOUNTER
Pt was scheduled for nutrition follow up on 7/2/25 but did not show.  Called and pt was not available.  LM on voicemail to please call [phone] to reschedule.

## 2025-07-14 ENCOUNTER — PATIENT MESSAGE (OUTPATIENT)
Age: 45
End: 2025-07-14

## 2025-07-14 DIAGNOSIS — E66.9 TYPE 2 DIABETES MELLITUS WITH OBESITY (HCC): ICD-10-CM

## 2025-07-14 DIAGNOSIS — E11.69 TYPE 2 DIABETES MELLITUS WITH OBESITY (HCC): ICD-10-CM

## 2025-08-13 ENCOUNTER — TELEPHONE (OUTPATIENT)
Age: 45
End: 2025-08-13

## 2025-08-15 DIAGNOSIS — E66.9 TYPE 2 DIABETES MELLITUS WITH OBESITY (HCC): ICD-10-CM

## 2025-08-15 DIAGNOSIS — E11.69 TYPE 2 DIABETES MELLITUS WITH OBESITY (HCC): ICD-10-CM

## 2025-08-21 ENCOUNTER — OFFICE VISIT (OUTPATIENT)
Age: 45
End: 2025-08-21
Payer: MEDICAID

## 2025-08-21 VITALS
BODY MASS INDEX: 41.95 KG/M2 | HEART RATE: 104 BPM | DIASTOLIC BLOOD PRESSURE: 67 MMHG | WEIGHT: 293 LBS | TEMPERATURE: 96.9 F | SYSTOLIC BLOOD PRESSURE: 112 MMHG | HEIGHT: 70 IN

## 2025-08-21 DIAGNOSIS — E66.813 CLASS 3 SEVERE OBESITY DUE TO EXCESS CALORIES WITH SERIOUS COMORBIDITY AND BODY MASS INDEX (BMI) OF 50.0 TO 59.9 IN ADULT (HCC): ICD-10-CM

## 2025-08-21 DIAGNOSIS — E66.9 TYPE 2 DIABETES MELLITUS WITH OBESITY (HCC): ICD-10-CM

## 2025-08-21 DIAGNOSIS — E11.69 TYPE 2 DIABETES MELLITUS WITH OBESITY (HCC): ICD-10-CM

## 2025-08-21 DIAGNOSIS — I10 ESSENTIAL HYPERTENSION: Primary | ICD-10-CM

## 2025-08-21 PROCEDURE — 3074F SYST BP LT 130 MM HG: CPT | Performed by: INTERNAL MEDICINE

## 2025-08-21 PROCEDURE — 3078F DIAST BP <80 MM HG: CPT | Performed by: INTERNAL MEDICINE

## 2025-08-21 PROCEDURE — 99214 OFFICE O/P EST MOD 30 MIN: CPT | Performed by: INTERNAL MEDICINE

## 2025-08-21 PROCEDURE — G8417 CALC BMI ABV UP PARAM F/U: HCPCS | Performed by: INTERNAL MEDICINE

## 2025-08-21 PROCEDURE — 2022F DILAT RTA XM EVC RTNOPTHY: CPT | Performed by: INTERNAL MEDICINE

## 2025-08-21 PROCEDURE — 1036F TOBACCO NON-USER: CPT | Performed by: INTERNAL MEDICINE

## 2025-08-21 PROCEDURE — G8428 CUR MEDS NOT DOCUMENT: HCPCS | Performed by: INTERNAL MEDICINE

## 2025-08-21 PROCEDURE — 3051F HG A1C>EQUAL 7.0%<8.0%: CPT | Performed by: INTERNAL MEDICINE

## 2025-08-21 PROCEDURE — 99213 OFFICE O/P EST LOW 20 MIN: CPT | Performed by: INTERNAL MEDICINE

## 2025-08-21 RX ORDER — LIRAGLUTIDE 6 MG/ML
INJECTION SUBCUTANEOUS
Qty: 3 ADJUSTABLE DOSE PRE-FILLED PEN SYRINGE | Refills: 2 | Status: SHIPPED | OUTPATIENT
Start: 2025-08-21

## 2025-08-26 ENCOUNTER — TELEPHONE (OUTPATIENT)
Dept: CARDIOLOGY CLINIC | Age: 45
End: 2025-08-26

## (undated) DEVICE — AMD ANTIMICROBIAL GAUZE SPONGES,12 PLY USP TYPE VII, 0.2% POLYHEXAMETHYLENE BIGUANIDE HCI (PHMB): Brand: CURITY

## (undated) DEVICE — NEEDLE HYPO 18GA L1.5IN THN WALL PIVOTING SHLD BVL ORIENTED

## (undated) DEVICE — SET IRRIG L94IN ID0.281IN W/ 4.5IN DST FLX CONN 2 LD ON OFF

## (undated) DEVICE — (D)PREP SKN CHLRAPRP APPL 26ML -- CONVERT TO ITEM 371833

## (undated) DEVICE — STERILE HOOK LOCK LATEX FREE ELASTIC BANDAGE 6INX5YD: Brand: HOOK LOCK™

## (undated) DEVICE — T-DRAPE,EXTREMITY,STERILE: Brand: MEDLINE

## (undated) DEVICE — SOLUTION IRRIG 3000ML 0.9% SOD CHL FLX CONT 0797208] ICU MEDICAL INC]

## (undated) DEVICE — STOCKINETTE,IMPERVIOUS,12X48,STERILE: Brand: MEDLINE

## (undated) DEVICE — SINGLE PORT MANIFOLD: Brand: NEPTUNE 2

## (undated) DEVICE — BANDAGE COMPR SELF ADH 5 YDX6 IN TAN STRL PREMIERPRO LF

## (undated) DEVICE — SYR LR LCK 1ML GRAD NSAF 30ML --

## (undated) DEVICE — PADDING CAST W4INXL4YD ST COT COHESIVE HND TEARABLE SPEC

## (undated) DEVICE — XEROFORM OCCLUSIVE GAUZE STRIP OVERWRAP, 3% BISMUTH TRIBROMOPHENATE IN PETROLATUM BLEND: Brand: XEROFORM

## (undated) DEVICE — BLADE SHV CUT MENIS AGG + 4MM --

## (undated) DEVICE — SURGICAL PROCEDURE PACK BASIC ST FRANCIS

## (undated) DEVICE — INTENDED FOR TISSUE SEPARATION, AND OTHER PROCEDURES THAT REQUIRE A SHARP SURGICAL BLADE TO PUNCTURE OR CUT.: Brand: BARD-PARKER ® STAINLESS STEEL BLADES

## (undated) DEVICE — SET IRRIG DST FLX M CONN